# Patient Record
Sex: FEMALE | Race: WHITE | HISPANIC OR LATINO | URBAN - METROPOLITAN AREA
[De-identification: names, ages, dates, MRNs, and addresses within clinical notes are randomized per-mention and may not be internally consistent; named-entity substitution may affect disease eponyms.]

---

## 2017-01-02 ENCOUNTER — EMERGENCY (EMERGENCY)
Facility: HOSPITAL | Age: 27
LOS: 0 days | Discharge: HOME | End: 2017-01-02

## 2017-06-27 DIAGNOSIS — Z88.0 ALLERGY STATUS TO PENICILLIN: ICD-10-CM

## 2017-06-27 DIAGNOSIS — Z90.89 ACQUIRED ABSENCE OF OTHER ORGANS: ICD-10-CM

## 2017-06-27 DIAGNOSIS — J09.X2 INFLUENZA DUE TO IDENTIFIED NOVEL INFLUENZA A VIRUS WITH OTHER RESPIRATORY MANIFESTATIONS: ICD-10-CM

## 2017-06-27 DIAGNOSIS — Z98.890 OTHER SPECIFIED POSTPROCEDURAL STATES: ICD-10-CM

## 2017-06-27 DIAGNOSIS — J45.909 UNSPECIFIED ASTHMA, UNCOMPLICATED: ICD-10-CM

## 2017-06-27 DIAGNOSIS — R05 COUGH: ICD-10-CM

## 2017-07-28 ENCOUNTER — EMERGENCY (EMERGENCY)
Facility: HOSPITAL | Age: 27
LOS: 1 days | Discharge: HOME | End: 2017-07-28

## 2017-07-28 DIAGNOSIS — J45.909 UNSPECIFIED ASTHMA, UNCOMPLICATED: ICD-10-CM

## 2017-07-28 DIAGNOSIS — R10.9 UNSPECIFIED ABDOMINAL PAIN: ICD-10-CM

## 2017-07-28 DIAGNOSIS — L53.8 OTHER SPECIFIED ERYTHEMATOUS CONDITIONS: ICD-10-CM

## 2017-07-28 DIAGNOSIS — E28.2 POLYCYSTIC OVARIAN SYNDROME: ICD-10-CM

## 2017-07-28 DIAGNOSIS — Z88.0 ALLERGY STATUS TO PENICILLIN: ICD-10-CM

## 2017-07-28 DIAGNOSIS — Z90.49 ACQUIRED ABSENCE OF OTHER SPECIFIED PARTS OF DIGESTIVE TRACT: ICD-10-CM

## 2017-07-28 DIAGNOSIS — Z98.890 OTHER SPECIFIED POSTPROCEDURAL STATES: ICD-10-CM

## 2017-07-28 DIAGNOSIS — Z48.01 ENCOUNTER FOR CHANGE OR REMOVAL OF SURGICAL WOUND DRESSING: ICD-10-CM

## 2017-11-06 ENCOUNTER — EMERGENCY (EMERGENCY)
Facility: HOSPITAL | Age: 27
LOS: 0 days | Discharge: HOME | End: 2017-11-06
Admitting: INTERNAL MEDICINE

## 2017-11-06 DIAGNOSIS — R10.9 UNSPECIFIED ABDOMINAL PAIN: ICD-10-CM

## 2017-11-06 DIAGNOSIS — E28.2 POLYCYSTIC OVARIAN SYNDROME: ICD-10-CM

## 2017-11-09 ENCOUNTER — EMERGENCY (EMERGENCY)
Facility: HOSPITAL | Age: 27
LOS: 0 days | Discharge: HOME | End: 2017-11-10
Admitting: INTERNAL MEDICINE

## 2017-11-09 DIAGNOSIS — R21 RASH AND OTHER NONSPECIFIC SKIN ERUPTION: ICD-10-CM

## 2017-11-09 DIAGNOSIS — R10.9 UNSPECIFIED ABDOMINAL PAIN: ICD-10-CM

## 2017-11-09 DIAGNOSIS — K13.79 OTHER LESIONS OF ORAL MUCOSA: ICD-10-CM

## 2017-11-09 DIAGNOSIS — E28.2 POLYCYSTIC OVARIAN SYNDROME: ICD-10-CM

## 2017-11-09 DIAGNOSIS — Z90.49 ACQUIRED ABSENCE OF OTHER SPECIFIED PARTS OF DIGESTIVE TRACT: ICD-10-CM

## 2017-11-09 DIAGNOSIS — K12.0 RECURRENT ORAL APHTHAE: ICD-10-CM

## 2017-11-09 DIAGNOSIS — Z90.89 ACQUIRED ABSENCE OF OTHER ORGANS: ICD-10-CM

## 2017-11-09 DIAGNOSIS — J45.909 UNSPECIFIED ASTHMA, UNCOMPLICATED: ICD-10-CM

## 2017-11-09 DIAGNOSIS — Z88.0 ALLERGY STATUS TO PENICILLIN: ICD-10-CM

## 2017-11-10 DIAGNOSIS — Z90.89 ACQUIRED ABSENCE OF OTHER ORGANS: ICD-10-CM

## 2017-11-10 DIAGNOSIS — Z90.49 ACQUIRED ABSENCE OF OTHER SPECIFIED PARTS OF DIGESTIVE TRACT: ICD-10-CM

## 2017-11-10 DIAGNOSIS — Z88.0 ALLERGY STATUS TO PENICILLIN: ICD-10-CM

## 2017-11-10 DIAGNOSIS — K12.0 RECURRENT ORAL APHTHAE: ICD-10-CM

## 2017-11-10 DIAGNOSIS — K14.9 DISEASE OF TONGUE, UNSPECIFIED: ICD-10-CM

## 2017-11-10 DIAGNOSIS — J45.909 UNSPECIFIED ASTHMA, UNCOMPLICATED: ICD-10-CM

## 2020-03-11 ENCOUNTER — EMERGENCY (EMERGENCY)
Facility: HOSPITAL | Age: 30
LOS: 1 days | Discharge: HOME | End: 2020-03-11
Attending: EMERGENCY MEDICINE | Admitting: EMERGENCY MEDICINE
Payer: COMMERCIAL

## 2020-03-11 VITALS
TEMPERATURE: 100 F | SYSTOLIC BLOOD PRESSURE: 123 MMHG | OXYGEN SATURATION: 97 % | RESPIRATION RATE: 18 BRPM | DIASTOLIC BLOOD PRESSURE: 80 MMHG | HEART RATE: 91 BPM

## 2020-03-11 VITALS
TEMPERATURE: 100 F | OXYGEN SATURATION: 100 % | RESPIRATION RATE: 18 BRPM | SYSTOLIC BLOOD PRESSURE: 136 MMHG | HEART RATE: 89 BPM | DIASTOLIC BLOOD PRESSURE: 75 MMHG

## 2020-03-11 LAB
ANION GAP SERPL CALC-SCNC: 12 MMOL/L — SIGNIFICANT CHANGE UP (ref 7–14)
BASOPHILS # BLD AUTO: 0.05 K/UL — SIGNIFICANT CHANGE UP (ref 0–0.2)
BASOPHILS NFR BLD AUTO: 0.6 % — SIGNIFICANT CHANGE UP (ref 0–1)
BUN SERPL-MCNC: 15 MG/DL — SIGNIFICANT CHANGE UP (ref 10–20)
CALCIUM SERPL-MCNC: 8.8 MG/DL — SIGNIFICANT CHANGE UP (ref 8.5–10.1)
CHLORIDE SERPL-SCNC: 107 MMOL/L — SIGNIFICANT CHANGE UP (ref 98–110)
CO2 SERPL-SCNC: 22 MMOL/L — SIGNIFICANT CHANGE UP (ref 17–32)
CREAT SERPL-MCNC: 0.8 MG/DL — SIGNIFICANT CHANGE UP (ref 0.7–1.5)
EOSINOPHIL # BLD AUTO: 0.03 K/UL — SIGNIFICANT CHANGE UP (ref 0–0.7)
EOSINOPHIL NFR BLD AUTO: 0.4 % — SIGNIFICANT CHANGE UP (ref 0–8)
FLU A RESULT: NEGATIVE — SIGNIFICANT CHANGE UP
FLU A RESULT: NEGATIVE — SIGNIFICANT CHANGE UP
FLUAV AG NPH QL: NEGATIVE — SIGNIFICANT CHANGE UP
FLUBV AG NPH QL: NEGATIVE — SIGNIFICANT CHANGE UP
GLUCOSE SERPL-MCNC: 95 MG/DL — SIGNIFICANT CHANGE UP (ref 70–99)
HCG SERPL QL: NEGATIVE — SIGNIFICANT CHANGE UP
HCT VFR BLD CALC: 38.6 % — SIGNIFICANT CHANGE UP (ref 37–47)
HGB BLD-MCNC: 12.5 G/DL — SIGNIFICANT CHANGE UP (ref 12–16)
IMM GRANULOCYTES NFR BLD AUTO: 0.3 % — SIGNIFICANT CHANGE UP (ref 0.1–0.3)
LACTATE SERPL-SCNC: 2.3 MMOL/L — HIGH (ref 0.7–2)
LYMPHOCYTES # BLD AUTO: 1.85 K/UL — SIGNIFICANT CHANGE UP (ref 1.2–3.4)
LYMPHOCYTES # BLD AUTO: 23.7 % — SIGNIFICANT CHANGE UP (ref 20.5–51.1)
MCHC RBC-ENTMCNC: 26.6 PG — LOW (ref 27–31)
MCHC RBC-ENTMCNC: 32.4 G/DL — SIGNIFICANT CHANGE UP (ref 32–37)
MCV RBC AUTO: 82.1 FL — SIGNIFICANT CHANGE UP (ref 81–99)
MONOCYTES # BLD AUTO: 0.67 K/UL — HIGH (ref 0.1–0.6)
MONOCYTES NFR BLD AUTO: 8.6 % — SIGNIFICANT CHANGE UP (ref 1.7–9.3)
NEUTROPHILS # BLD AUTO: 5.18 K/UL — SIGNIFICANT CHANGE UP (ref 1.4–6.5)
NEUTROPHILS NFR BLD AUTO: 66.4 % — SIGNIFICANT CHANGE UP (ref 42.2–75.2)
NRBC # BLD: 0 /100 WBCS — SIGNIFICANT CHANGE UP (ref 0–0)
PLATELET # BLD AUTO: 275 K/UL — SIGNIFICANT CHANGE UP (ref 130–400)
POTASSIUM SERPL-MCNC: 4 MMOL/L — SIGNIFICANT CHANGE UP (ref 3.5–5)
POTASSIUM SERPL-SCNC: 4 MMOL/L — SIGNIFICANT CHANGE UP (ref 3.5–5)
RBC # BLD: 4.7 M/UL — SIGNIFICANT CHANGE UP (ref 4.2–5.4)
RBC # FLD: 12.6 % — SIGNIFICANT CHANGE UP (ref 11.5–14.5)
RSV RESULT: NEGATIVE — SIGNIFICANT CHANGE UP
RSV RNA RESP QL NAA+PROBE: NEGATIVE — SIGNIFICANT CHANGE UP
SODIUM SERPL-SCNC: 141 MMOL/L — SIGNIFICANT CHANGE UP (ref 135–146)
WBC # BLD: 7.8 K/UL — SIGNIFICANT CHANGE UP (ref 4.8–10.8)
WBC # FLD AUTO: 7.8 K/UL — SIGNIFICANT CHANGE UP (ref 4.8–10.8)

## 2020-03-11 PROCEDURE — 71046 X-RAY EXAM CHEST 2 VIEWS: CPT | Mod: 26

## 2020-03-11 PROCEDURE — 99285 EMERGENCY DEPT VISIT HI MDM: CPT

## 2020-03-11 PROCEDURE — 93010 ELECTROCARDIOGRAM REPORT: CPT

## 2020-03-11 RX ORDER — ACETAMINOPHEN 500 MG
975 TABLET ORAL ONCE
Refills: 0 | Status: COMPLETED | OUTPATIENT
Start: 2020-03-11 | End: 2020-03-11

## 2020-03-11 RX ORDER — KETOROLAC TROMETHAMINE 30 MG/ML
30 SYRINGE (ML) INJECTION ONCE
Refills: 0 | Status: DISCONTINUED | OUTPATIENT
Start: 2020-03-11 | End: 2020-03-11

## 2020-03-11 RX ORDER — SODIUM CHLORIDE 9 MG/ML
1000 INJECTION, SOLUTION INTRAVENOUS ONCE
Refills: 0 | Status: COMPLETED | OUTPATIENT
Start: 2020-03-11 | End: 2020-03-11

## 2020-03-11 RX ADMIN — SODIUM CHLORIDE 1000 MILLILITER(S): 9 INJECTION, SOLUTION INTRAVENOUS at 18:50

## 2020-03-11 RX ADMIN — SODIUM CHLORIDE 1000 MILLILITER(S): 9 INJECTION, SOLUTION INTRAVENOUS at 17:12

## 2020-03-11 RX ADMIN — Medication 30 MILLIGRAM(S): at 17:13

## 2020-03-11 RX ADMIN — Medication 975 MILLIGRAM(S): at 18:27

## 2020-03-11 NOTE — ED PROVIDER NOTE - PROGRESS NOTE DETAILS
pt placed in [facility] program [place]: patient signed out to me by YIMI parks to f/u rpt vbg, ekg, disposition. patient currently talking on cell phone, no complaints at this time. fluids hanging, will reassess.

## 2020-03-11 NOTE — ED PROVIDER NOTE - PATIENT PORTAL LINK FT
You can access the FollowMyHealth Patient Portal offered by [facility] by registering at the following website: [email or web address]. By joining [employer]’s FollowMyHealth portal, you will also be able to view your health information using other applications (apps) compatible with our system.

## 2020-03-11 NOTE — ED PROVIDER NOTE - CLINICAL SUMMARY MEDICAL DECISION MAKING FREE TEXT BOX
pt with 3 day h/o flu-like symptoms.  flu swab neg.  cxr - no infiltrates seen.  labs reviewed, cbc/cmp ok, lactate 2.3.  Pt received IVF in ER, well appearing, VS ok, plan for d/c home.  pt did not want to wait for repeat lactate.  d/w pt to self-quarantine at home, to f/u with pmd, and told to return to ER if she feels worse, has worsening sob, weakness, or any other new/concerning symptoms.  pt d/c'd with covid-19 education document.

## 2020-03-11 NOTE — ED PROVIDER NOTE - NSFOLLOWUPINSTRUCTIONS_ED_ALL_ED_FT
The number of available tests is very limited so strict rules exist for who is allowed to be tested. [facility] has been authorized to perform testing and is currently working hard to be able to start providing the test. Such testing is currently reserved for patients who have had contact with someone infected with the virus, or those who are very sick a plus those who have traveled to areas identified by the Centers for Disease Control and Prevention (CDC) and will require hospitalization.    If you are well enough to be discharged home and are not in a high risk group to have contracted the COVID-19, you should care for yourself at home exactly like you would if you have Influenza “flu”. Follow all the standard guidelines about washing your hands, covering your cough, etc. You should return to the Emergency Department if you develop worse symptoms, trouble breathing, chest pain, and/or a fever that doesn’t improve with over the counter acetaminophen or ibuprofen.

## 2020-03-11 NOTE — ED ADULT TRIAGE NOTE - CHIEF COMPLAINT QUOTE
Pt with fever and cough, sent in by MD Cifuentes R/O Coronavirus, no contact with any infected persons, works for the board of [employer]

## 2020-03-11 NOTE — ED PROVIDER NOTE - PHYSICAL EXAMINATION
VITAL SIGNS: I have reviewed nursing notes and confirm.  CONSTITUTIONAL: Well-developed; well-nourished; in no acute distress.   SKIN: skin exam is warm and dry, no acute rash.    HEAD: Normocephalic; atraumatic.  EYES: conjunctiva and sclera clear.  ENT: No nasal discharge; airway clear.  NECK: Supple; non tender.  CARD: S1, S2 normal; no murmurs, gallops, or rubs. Regular rate and rhythm.   RESP: No wheezes, rales or rhonchi.  ABD: Normal bowel sounds; soft; non-distended; non-tender  EXT: Normal ROM.  No clubbing, cyanosis or edema.   LYMPH: No acute cervical adenopathy.  NEURO: Alert, oriented, grossly unremarkable  PSYCH: Cooperative, appropriate.

## 2020-03-11 NOTE — ED PROVIDER NOTE - NS ED ROS FT
Eyes:  No visual changes, eye pain or discharge.  ENMT:  No hearing changes, pain, discharge or infections. No neck pain or stiffness.  Cardiac:  No chest pain, SOB or edema. No chest pain with exertion.  Respiratory:  + cough + asthma, No respiratory distress. No hemoptysis.   GI:  No nausea, vomiting, diarrhea or abdominal pain.  :  No dysuria, frequency or burning.  MS:  No myalgia, muscle weakness, joint pain or back pain.  Neuro:  No headache or weakness.  No LOC.  Skin:  No skin rash.   Endocrine: No history of thyroid disease or diabetes.  Except as documented in the HPI,  all other systems are negative.

## 2020-03-11 NOTE — ED ADULT NURSE NOTE - NSIMPLEMENTINTERV_GEN_ALL_ED
Implemented All Universal Safety Interventions:  [place] to call system. Call bell, personal items and telephone within reach. Instruct patient to call for assistance. Room bathroom lighting operational. Non-slip footwear when patient is off stretcher. Physically safe environment: no spills, clutter or unnecessary equipment. Stretcher in lowest position, wheels locked, appropriate side rails in place.

## 2020-03-11 NOTE — ED PROVIDER NOTE - CARE PROVIDER_API CALL
Abelardo Cifuentes)  [place] [address]  Phone: [phone]  Fax: [phone]  Follow Up Time: 1-3 Days

## 2020-03-11 NOTE — ED PROVIDER NOTE - ATTENDING CONTRIBUTION TO CARE
30 y/o female with h/o asthma, pcos, in ER with c/o flu-like symptoms for past 3 days. + temp (to 100 in ER), no chills, + non-productive cough with some sob.  no cp.  + sore throat.  + body aches and generalized weakness.   decreased appetite. no abd pain.  no n/v/d.  no urinary symptoms. no rash.  no ha/dizziness/loc.  denies any recent travel. no known covid-19 contacts.    PE- nad, nc/at, eomi, perrl, op - clear, mmm, no pharyngeal erythema, neck supple with from, no resp distress, cta b/l, no w/r/r, rrr, abd - soft, nt/nd, nabs, from x 4, A&O x 3, no focal neuro deficits.  -ivf, check labs, cxr, flu/rvp swab.

## 2020-03-11 NOTE — ED PROVIDER NOTE - OBJECTIVE STATEMENT
Pt is a 28y/o female with a pmhx of asthma, PCOS presents today for eval of sore throat/fever/dry cough x 3 days that have been constant with no aggravating/alleviating factors. Pt denies fever, chills, weakness, numbness.

## 2020-03-12 ENCOUNTER — TRANSCRIPTION ENCOUNTER (OUTPATIENT)
Age: 30
End: 2020-03-12

## 2020-03-12 LAB — RAPID RVP RESULT: SIGNIFICANT CHANGE UP

## 2020-03-15 DIAGNOSIS — J06.9 ACUTE UPPER RESPIRATORY INFECTION, UNSPECIFIED: ICD-10-CM

## 2020-03-15 DIAGNOSIS — R05 COUGH: ICD-10-CM

## 2020-03-15 DIAGNOSIS — Z88.0 ALLERGY STATUS TO PENICILLIN: ICD-10-CM

## 2020-03-15 DIAGNOSIS — R09.81 NASAL CONGESTION: ICD-10-CM

## 2020-03-15 DIAGNOSIS — R50.9 FEVER, UNSPECIFIED: ICD-10-CM

## 2020-03-15 DIAGNOSIS — Z87.09 PERSONAL HISTORY OF OTHER DISEASES OF THE RESPIRATORY SYSTEM: ICD-10-CM

## 2020-03-15 DIAGNOSIS — Z91.012 ALLERGY TO EGGS: ICD-10-CM

## 2020-03-27 ENCOUNTER — EMERGENCY (EMERGENCY)
Facility: HOSPITAL | Age: 30
LOS: 0 days | Discharge: HOME | End: 2020-03-27
Admitting: EMERGENCY MEDICINE
Payer: COMMERCIAL

## 2020-03-27 VITALS
TEMPERATURE: 99 F | HEART RATE: 96 BPM | OXYGEN SATURATION: 99 % | SYSTOLIC BLOOD PRESSURE: 118 MMHG | RESPIRATION RATE: 18 BRPM | DIASTOLIC BLOOD PRESSURE: 75 MMHG

## 2020-03-27 DIAGNOSIS — Z91.012 ALLERGY TO EGGS: ICD-10-CM

## 2020-03-27 DIAGNOSIS — J45.909 UNSPECIFIED ASTHMA, UNCOMPLICATED: ICD-10-CM

## 2020-03-27 DIAGNOSIS — R05 COUGH: ICD-10-CM

## 2020-03-27 DIAGNOSIS — J06.9 ACUTE UPPER RESPIRATORY INFECTION, UNSPECIFIED: ICD-10-CM

## 2020-03-27 DIAGNOSIS — Z88.0 ALLERGY STATUS TO PENICILLIN: ICD-10-CM

## 2020-03-27 PROCEDURE — 71045 X-RAY EXAM CHEST 1 VIEW: CPT | Mod: 26

## 2020-03-27 PROCEDURE — 99284 EMERGENCY DEPT VISIT MOD MDM: CPT

## 2020-03-27 RX ORDER — IBUPROFEN 200 MG
600 TABLET ORAL ONCE
Refills: 0 | Status: COMPLETED | OUTPATIENT
Start: 2020-03-27 | End: 2020-03-27

## 2020-03-27 RX ORDER — ALBUTEROL 90 UG/1
2 AEROSOL, METERED ORAL ONCE
Refills: 0 | Status: COMPLETED | OUTPATIENT
Start: 2020-03-27 | End: 2020-03-27

## 2020-03-27 RX ORDER — IPRATROPIUM/ALBUTEROL SULFATE 18-103MCG
3 AEROSOL WITH ADAPTER (GRAM) INHALATION
Qty: 45 | Refills: 0
Start: 2020-03-27 | End: 2020-03-31

## 2020-03-27 RX ORDER — ACETAMINOPHEN 500 MG
2 TABLET ORAL
Qty: 30 | Refills: 0
Start: 2020-03-27 | End: 2020-03-31

## 2020-03-27 RX ADMIN — ALBUTEROL 2 PUFF(S): 90 AEROSOL, METERED ORAL at 18:23

## 2020-03-27 RX ADMIN — Medication 600 MILLIGRAM(S): at 18:23

## 2020-03-27 NOTE — ED ADULT TRIAGE NOTE - CHIEF COMPLAINT QUOTE
pt reports coughing, fever since March 11   c/o difficulty breathing   reports she has a coworker who is (+)COVID  pt speaking in full sentences in triage, no s/s respiratory distress noted

## 2020-03-27 NOTE — ED PROVIDER NOTE - CARE PLAN
Principal Discharge DX:	Cough  Secondary Diagnosis:	URI (upper respiratory infection)  Secondary Diagnosis:	Asthma

## 2020-03-27 NOTE — ED PROVIDER NOTE - OBJECTIVE STATEMENT
30 y/o female with hx Asthma, PCOS presents to the ED c/o "I've been sick since 3/11. I have fever, body aches, fatigue, cough and congestion. I feel SOB. I'm currently taking a medrol dosepak. +sick contacts covid 19. "

## 2020-03-27 NOTE — ED PROVIDER NOTE - NSFOLLOWUPINSTRUCTIONS_ED_ALL_ED_FT
Viral Respiratory Infection    A viral respiratory infection is an illness that affects parts of the body used for breathing, like the lungs, nose, and throat. It is caused by a germ called a virus. Symptoms can include runny nose, coughing, sneezing, fatigue, body aches, sore throat, fever, or headache. Over the counter medicine can be used to manage the symptoms but the infection typically goes away on its own in 5 to 10 days.     SEEK IMMEDIATE MEDICAL CARE IF YOU HAVE ANY OF THE FOLLOWING SYMPTOMS: shortness of breath, chest pain, fever over 10 days, or lightheadedness/dizziness.  Asthma    Asthma is a condition in which the airways tighten and narrow, making it difficult to breath. Asthma episodes, also called asthma attacks, range from minor to life-threatening. Symptoms include wheezing, coughing, chest tightness, or shortness of breath. The diagnosis of asthma is made by a review of your medical history and a physical exam, but may involve additional testing. Asthma cannot be cured, but medicines and lifestyle changes can help control it. Avoid triggers of asthma which may include animal dander, pollen, mold, smoke, air pollutants, etc.     SEEK IMMEDIATE MEDICAL CARE IF YOU HAVE ANY OF THE FOLLOWING SYMPTOMS: worsening of symptoms, shortness of breath at rest, chest pain, bluish discoloration to lips or fingertips, lightheadedness/dizziness, or fever.

## 2020-03-27 NOTE — ED PROVIDER NOTE - PATIENT PORTAL LINK FT
You can access the FollowMyHealth Patient Portal offered by [facility] by registering at the following website: [email or web address]. By joining [employer]’s FollowMyHealth portal, you will also be able to view your health information using other applications (apps) compatible with our system.

## 2020-03-27 NOTE — ED ADULT NURSE NOTE - OBJECTIVE STATEMENT
Patient c/o fevers chill and cough since beginning of march and states started having fevers over the last few days.

## 2022-06-07 ENCOUNTER — NON-APPOINTMENT (OUTPATIENT)
Age: 32
End: 2022-06-07

## 2022-10-31 NOTE — ED ADULT TRIAGE NOTE - TEMPERATURE IN CELSIUS (DEGREES C)
Keflex 500 mg oral capsule , 1 cap(s) orally 2 times a day   latanoprost 0.005% ophthalmic solution , 1 drop(s) to each affected eye once a day (at bedtime)  aspirin 81 mg oral tablet , 81 milligram(s) orally once a day  benztropine 1 mg oral tablet , 1 tab(s) orally once a day (at bedtime)  divalproex sodium 500 mg oral tablet, extended release , 2 tab(s) orally once a day (at bedtime)  Haldol Decanoate 50 mg/mL intramuscular solution , intramuscular every 4 weeks  Rhopressa 0.02% ophthalmic solution , 1 drop(s) to each affected eye once a day (in the evening)  Combigan 0.2%-0.5% ophthalmic solution , 1 drop(s) to each affected eye every 12 hours   38 haloperidol 1 mg oral tablet , 1 tab(s) orally 2 times a day x 30 days   amLODIPine 10 mg oral tablet , 1 tab(s) orally once a day x 30 days   Haldol Decanoate 50 mg/mL intramuscular solution , 100 milligram(s) intramuscular every 4 weeks  Next due on 11/13/2022  benztropine 0.5 mg oral tablet , 1 tab(s) orally 2 times a day x 30 days

## 2022-12-02 NOTE — ED PROVIDER NOTE - TOBACCO USE
This was a shared visit with the NEVAEH. I reviewed and verified the documentation and independently performed the documented: Never smoker

## 2023-10-24 PROBLEM — E28.2 POLYCYSTIC OVARIAN SYNDROME: Chronic | Status: ACTIVE | Noted: 2020-03-27

## 2023-10-24 PROBLEM — J45.909 UNSPECIFIED ASTHMA, UNCOMPLICATED: Chronic | Status: ACTIVE | Noted: 2020-03-27

## 2023-11-13 ENCOUNTER — LABORATORY RESULT (OUTPATIENT)
Age: 33
End: 2023-11-13

## 2023-11-14 ENCOUNTER — APPOINTMENT (OUTPATIENT)
Dept: OBGYN | Facility: CLINIC | Age: 33
End: 2023-11-14
Payer: COMMERCIAL

## 2023-11-14 VITALS — TEMPERATURE: 98 F | HEIGHT: 60 IN | WEIGHT: 210 LBS | BODY MASS INDEX: 41.23 KG/M2

## 2023-11-14 DIAGNOSIS — G43.909 MIGRAINE, UNSPECIFIED, NOT INTRACTABLE, W/OUT STATUS MIGRAINOSUS: ICD-10-CM

## 2023-11-14 DIAGNOSIS — Z01.419 ENCOUNTER FOR GYNECOLOGICAL EXAMINATION (GENERAL) (ROUTINE) W/OUT ABNORMAL FINDINGS: ICD-10-CM

## 2023-11-14 PROCEDURE — 76813 OB US NUCHAL MEAS 1 GEST: CPT

## 2023-11-14 PROCEDURE — 99385 PREV VISIT NEW AGE 18-39: CPT

## 2023-11-19 PROBLEM — G43.909 MIGRAINE WITHOUT STATUS MIGRAINOSUS, NOT INTRACTABLE, UNSPECIFIED MIGRAINE TYPE: Status: ACTIVE | Noted: 2023-11-19

## 2023-11-19 PROBLEM — Z01.419 WELL WOMAN EXAM WITH ROUTINE GYNECOLOGICAL EXAM: Status: ACTIVE | Noted: 2023-11-19

## 2023-11-22 ENCOUNTER — LABORATORY RESULT (OUTPATIENT)
Age: 33
End: 2023-11-22

## 2023-12-06 ENCOUNTER — NON-APPOINTMENT (OUTPATIENT)
Age: 33
End: 2023-12-06

## 2023-12-06 ENCOUNTER — APPOINTMENT (OUTPATIENT)
Dept: OBGYN | Facility: CLINIC | Age: 33
End: 2023-12-06
Payer: COMMERCIAL

## 2023-12-06 PROCEDURE — 0500F INITIAL PRENATAL CARE VISIT: CPT

## 2023-12-19 ENCOUNTER — NON-APPOINTMENT (OUTPATIENT)
Age: 33
End: 2023-12-19

## 2023-12-21 ENCOUNTER — NON-APPOINTMENT (OUTPATIENT)
Age: 33
End: 2023-12-21

## 2023-12-22 ENCOUNTER — EMERGENCY (EMERGENCY)
Facility: HOSPITAL | Age: 33
LOS: 0 days | Discharge: HOME | End: 2023-12-22
Attending: EMERGENCY MEDICINE
Payer: COMMERCIAL

## 2023-12-22 VITALS
TEMPERATURE: 98 F | SYSTOLIC BLOOD PRESSURE: 126 MMHG | DIASTOLIC BLOOD PRESSURE: 71 MMHG | HEIGHT: 60 IN | RESPIRATION RATE: 18 BRPM | HEART RATE: 111 BPM | OXYGEN SATURATION: 100 % | WEIGHT: 212.08 LBS

## 2023-12-22 DIAGNOSIS — N91.1 SECONDARY AMENORRHEA: ICD-10-CM

## 2023-12-22 DIAGNOSIS — Z23 ENCOUNTER FOR IMMUNIZATION: ICD-10-CM

## 2023-12-22 DIAGNOSIS — Z87.42 PERSONAL HISTORY OF OTHER DISEASES OF THE FEMALE GENITAL TRACT: ICD-10-CM
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
## 2023-12-22 DIAGNOSIS — S61.512A LACERATION WITHOUT FOREIGN BODY OF LEFT WRIST, INITIAL ENCOUNTER: ICD-10-CM

## 2023-12-22 DIAGNOSIS — Z91.012 ALLERGY TO EGGS: ICD-10-CM

## 2023-12-22 DIAGNOSIS — Z3A.17 17 WEEKS GESTATION OF PREGNANCY: ICD-10-CM

## 2023-12-22 DIAGNOSIS — Z88.0 ALLERGY STATUS TO PENICILLIN: ICD-10-CM

## 2023-12-22 DIAGNOSIS — O9A.212 INJURY, POISONING AND CERTAIN OTHER CONSEQUENCES OF EXTERNAL CAUSES COMPLICATING PREGNANCY, SECOND TRIMESTER: ICD-10-CM
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
## 2023-12-22 DIAGNOSIS — O98.512 OTHER VIRAL DISEASES COMPLICATING PREGNANCY, SECOND TRIMESTER: ICD-10-CM

## 2023-12-22 DIAGNOSIS — U07.1 COVID-19: ICD-10-CM

## 2023-12-22 LAB
FLUAV AG NPH QL: SIGNIFICANT CHANGE UP
FLUAV AG NPH QL: SIGNIFICANT CHANGE UP
FLUBV AG NPH QL: SIGNIFICANT CHANGE UP
FLUBV AG NPH QL: SIGNIFICANT CHANGE UP
RSV RNA NPH QL NAA+NON-PROBE: SIGNIFICANT CHANGE UP
RSV RNA NPH QL NAA+NON-PROBE: SIGNIFICANT CHANGE UP
SARS-COV-2 RNA SPEC QL NAA+PROBE: DETECTED
SARS-COV-2 RNA SPEC QL NAA+PROBE: DETECTED

## 2023-12-22 PROCEDURE — 90715 TDAP VACCINE 7 YRS/> IM: CPT

## 2023-12-22 PROCEDURE — 12031 INTMD RPR S/A/T/EXT 2.5 CM/<: CPT

## 2023-12-22 PROCEDURE — 0241U: CPT

## 2023-12-22 PROCEDURE — 12001 RPR S/N/AX/GEN/TRNK 2.5CM/<: CPT

## 2023-12-22 PROCEDURE — 99283 EMERGENCY DEPT VISIT LOW MDM: CPT | Mod: 25

## 2023-12-22 PROCEDURE — 90471 IMMUNIZATION ADMIN: CPT

## 2023-12-22 PROCEDURE — 99284 EMERGENCY DEPT VISIT MOD MDM: CPT | Mod: 25

## 2023-12-22 RX ORDER — TETANUS TOXOID, REDUCED DIPHTHERIA TOXOID AND ACELLULAR PERTUSSIS VACCINE, ADSORBED 5; 2.5; 8; 8; 2.5 [IU]/.5ML; [IU]/.5ML; UG/.5ML; UG/.5ML; UG/.5ML
0.5 SUSPENSION INTRAMUSCULAR ONCE
Refills: 0 | Status: COMPLETED | OUTPATIENT
Start: 2023-12-22 | End: 2023-12-22

## 2023-12-22 RX ADMIN — TETANUS TOXOID, REDUCED DIPHTHERIA TOXOID AND ACELLULAR PERTUSSIS VACCINE, ADSORBED 0.5 MILLILITER(S): 5; 2.5; 8; 8; 2.5 SUSPENSION INTRAMUSCULAR at 17:17

## 2023-12-22 NOTE — ED PROVIDER NOTE - PATIENT PORTAL LINK FT
You can access the FollowMyHealth Patient Portal offered by [facility] by registering at the following website: [email or web address]. By joining [employer]’s FollowMyHealth portal, you will also be able to view your health information using other applications (apps) compatible with our system. You can access the FollowMyHealth Patient Portal offered by [facility] by registering at the following website: [email or web address]. By joining "[employer]"’s FollowMyHealth portal, you will also be able to view your health information using other applications (apps) compatible with our system.

## 2023-12-22 NOTE — ED PROVIDER NOTE - IV ALTEPLASE DOOR HIDDEN
Monitor: The problem is improving with treatment.  Evaluation: No labs/tests required today.  Assessment/Treatment:  Managed by specialist.   Scans in 2 mos   show

## 2023-12-22 NOTE — ED PROVIDER NOTE - NSFOLLOWUPINSTRUCTIONS_ED_ALL_ED_FT
please return in 10-12 days for wound check and suture removal    Wound Care    Taking care of your wound properly can help to prevent pain and infection. It can also help your wound to heal more quickly.     HOW TO CARE FOR YOUR WOUND   Take or apply over-the-counter and prescription medicines only as told by your health care provider.  If you were prescribed antibiotic medicine, take or apply it as told by your health care provider. Do not stop using the antibiotic even if your condition improves.  Clean the wound each day or as told by your health care provider.  Wash the wound with mild soap and water.  Rinse the wound with water to remove all soap.  Pat the wound dry with a clean towel. Do not rub it.  There are many different ways to close and cover a wound. For example, a wound can be covered with stitches (sutures), skin glue, or adhesive strips. Follow instructions from your health care provider about:  How to take care of your wound.  When and how you should change your bandage (dressing).  When you should remove your dressing.  Removing whatever was used to close your wound.  Check your wound every day for signs of infection. Watch for:  Redness, swelling, or pain.  Fluid, blood, or pus.  Keep the dressing dry until your health care provider says it can be removed. Do not take baths, swim, use a hot tub, or do anything that would put your wound underwater until your health care provider approves.  Raise (elevate) the injured area above the level of your heart while you are sitting or lying down.  Do not scratch or pick at the wound.  Keep all follow-up visits as told by your health care provider. This is important.    SEEK MEDICAL CARE IF:  You received a tetanus shot and you have swelling, severe pain, redness, or bleeding at the injection site.  You have a fever.  Your pain is not controlled with medicine.  You have increased redness, swelling, or pain at the site of your wound.  You have fluid, blood, or pus coming from your wound.  You notice a bad smell coming from your wound or your dressing.    SEEK IMMEDIATE MEDICAL CARE IF:  You have a red streak going away from your wound.

## 2023-12-22 NOTE — ED PROVIDER NOTE - OBJECTIVE STATEMENT
33-year-old female 17 weeks pregnant presented to ED for evaluation of laceration to the left arm status post negative slice.  Patient states she was preparing food when she excellently cut her arm getting small incision near her wrist.  Patient states she also believes she may have COVID and is requesting COVID test.  Bleeding controlled in the

## 2023-12-22 NOTE — ED PROVIDER NOTE - PHYSICAL EXAMINATION
VITAL SIGNS: I have reviewed nursing notes and confirm.  CONSTITUTIONAL:  in no acute distress.  SKIN: Skin exam is warm and dry, no acute rash. 1.5 cm laceration to left wrist vertically, no active bleeding   HEAD: Normocephalic; atraumatic.

## 2023-12-22 NOTE — ED PROVIDER NOTE - CLINICAL SUMMARY MEDICAL DECISION MAKING FREE TEXT BOX
Laceration as described above.  Hemostatic on exam.  Normal motor and sensation distally.  Repaired under local.  DC home.  Sutures out in 7-10 days.  Tdap given.  Strict return/infection instructions discussed.

## 2023-12-22 NOTE — ED ADULT NURSE NOTE - NSFALLUNIVINTERV_ED_ALL_ED
Bed/Stretcher in lowest position, wheels locked, appropriate side rails in place/Call bell, personal items and telephone in reach/Instruct patient to call for assistance before getting out of bed/chair/stretcher/Non-slip footwear applied when patient is off stretcher/[place] to call system/Physically safe environment - no spills, clutter or unnecessary equipment/Purposeful proactive rounding/Room/bathroom lighting operational, light cord in reach Bed/Stretcher in lowest position, wheels locked, appropriate side rails in place/Call bell, personal items and telephone in reach/Instruct patient to call for assistance before getting out of bed/chair/stretcher/Non-slip footwear applied when patient is off stretcher/[place] to call system/Physically safe environment - no spills, clutter or unnecessary equipment/Purposeful proactive rounding/Room/bathroom lighting operational, light cord in reach

## 2023-12-22 NOTE — ED ADULT TRIAGE NOTE - CHIEF COMPLAINT QUOTE
Pt presents with laceration to left forearm after slicing in while cleaning pork. Pt also 17 weeks pregnant. Pt also COVID +

## 2023-12-26 ENCOUNTER — TRANSCRIPTION ENCOUNTER (OUTPATIENT)
Age: 33
End: 2023-12-26

## 2023-12-26 ENCOUNTER — LABORATORY RESULT (OUTPATIENT)
Age: 33
End: 2023-12-26

## 2023-12-26 ENCOUNTER — APPOINTMENT (OUTPATIENT)
Dept: OBGYN | Facility: CLINIC | Age: 33
End: 2023-12-26
Payer: COMMERCIAL

## 2023-12-26 VITALS — HEIGHT: 60 IN | WEIGHT: 209 LBS | BODY MASS INDEX: 41.03 KG/M2

## 2023-12-26 PROCEDURE — 0502F SUBSEQUENT PRENATAL CARE: CPT

## 2024-01-03 ENCOUNTER — NON-APPOINTMENT (OUTPATIENT)
Age: 34
End: 2024-01-03

## 2024-01-04 ENCOUNTER — APPOINTMENT (OUTPATIENT)
Dept: OBGYN | Facility: CLINIC | Age: 34
End: 2024-01-04
Payer: COMMERCIAL

## 2024-01-04 ENCOUNTER — NON-APPOINTMENT (OUTPATIENT)
Age: 34
End: 2024-01-04

## 2024-01-04 PROCEDURE — 76816 OB US FOLLOW-UP PER FETUS: CPT

## 2024-01-04 PROCEDURE — 76805 OB US >/= 14 WKS SNGL FETUS: CPT

## 2024-01-05 ENCOUNTER — APPOINTMENT (OUTPATIENT)
Dept: ANTEPARTUM | Facility: CLINIC | Age: 34
End: 2024-01-05
Payer: COMMERCIAL

## 2024-01-05 ENCOUNTER — OUTPATIENT (OUTPATIENT)
Dept: OUTPATIENT SERVICES | Facility: HOSPITAL | Age: 34
LOS: 1 days | End: 2024-01-05
Payer: COMMERCIAL

## 2024-01-05 DIAGNOSIS — O28.1 ABNORMAL BIOCHEMICAL FINDING ON ANTENATAL SCREENING OF MOTHER: ICD-10-CM

## 2024-01-05 DIAGNOSIS — Z34.90 ENCOUNTER FOR SUPERVISION OF NORMAL PREGNANCY, UNSPECIFIED, UNSPECIFIED TRIMESTER: ICD-10-CM

## 2024-01-05 PROCEDURE — 96040: CPT

## 2024-01-05 PROCEDURE — 99212 OFFICE O/P EST SF 10 MIN: CPT

## 2024-01-05 NOTE — HISTORY OF PRESENT ILLNESS
[FreeTextEntry1] : REASON FOR VISIT: Ms. Martha Gibbons is a 33-year-old  female with a gestational age of _ weeks and _ days based on an BESS of 2024 who was seen for genetic counseling through [facility] on 2024 for elevated maternal serum AFP. The patient was accompanied by her partner, Sayda.   RELEVANT MEDICAL AND SURGICAL HISTORY: - Chronic migraines - Asthma  - PMHx PCOS - Tonsillectomy - Appendectomy   PREGNANCY HISTORY: Conception: Assisted with IVF Bleeding/Spotting: Bleeding in  evening through morning Illness/Infection: COVID 23 fever of 100.7, 23 stitches for cut on arm Fever:  Medications: Baby aspirin,  Alcohol: Denied  Drugs/Smoking: Denied  Other: Denied    COUNSELING NARRATIVE: #High Maternal Serum AFP At her session, we discussed that her AFP was elevated at 2.89 MoMs. The laboratory assessed the risk for an open neural tube defect (ONTD to be 1 in 142. The clinical manifestations of ONTDs were reviewed with this patient. Other possible reasons for elevated MSAFP which were discussed with the patient include an abdominal wall defect, placental bleeds, errors in gestational dating, and multiple gestations. The patient stated she had some bleeding during her pregnancy in October. Additionally, this pregnancy was conceived via donor oocyte, which has been noted to be associated with slightly increased levels of MSAFP.   An ultrasound examination can identify a twin gestation or an incorrect calculation of gestational age. Ultrasound is also helpful in detecting large NTDs.  However, as small NTDs may go undetected by ultrasound, amniocentesis can be performed to measure the amount of AFP in the amniotic fluid.  Measurement of AFP in the amniotic fluid is more reliable than maternal serum AFP. Amniotic fluid can also detect acetylcholinesterase which is a specific marker for neural tube defects. The amniotic fluid AFP test has a 95-98% detection rate with a comprehensive ultrasound for open neural tube defects.  Only open defects (ones in which no membrane or skin covers the opening in the neural tube) can be detected. The risks, benefits, and limitations were explained including the 1/400 risk of miscarriage or other complication with invasive testing. Diagnostic testing also allows for karyotyping and microarray analysis.   Fetal scan was within normal limits for the spine and abdomen. Follow-up ultrasound is recommended to monitor fetal growth.   The patient declined amniocentesis at this time. The patient was also offered a redraw of AFP, with the understanding a normal redraw would not completely eliminate the risk of an affected pregnancy. The patient opted to have her blood redrawn to test her serum AFP. The patient was also given a script for viral studies (CMV, Parvo, Toxo), to evaluate for possible viral infections during her pregnancy.   We discussed that unexplained elevations of MSAFP have been associated with an increased risk of obstetrical complications later in pregnancy.   #IVF  We reviewed the risks associated with in vitro fertilization (IVF) pregnancies.  The IVF procedure itself does not appear to lead to a higher risk of chromosomal abnormalities compared to the general population risk, however, several other factors may increase the risk, such as pregnancy at an older age, polycystic ovarian syndrome and severe male or female factor infertility. Genetic issues observed include microdeletion of the Y chromosome, fragile X syndrome and imprinting disorders (i.e. Elmer-Wiedemann syndrome, Karsten -Silver syndrome, Angelman syndrome and Prader-Willi syndrome). Pre-implantation genetic testing (PGT) often accompanies IVF to screen for either aneuploidy, monogenic or structural disorders, however, PGT is a technique generally testing the trophectoderm which eventually gives rise to the placenta, not the fetus. Discordant aneuploidy findings between the placenta and fetus have been reported to be as high as 50% and misdiagnosis of the embryo can occur, therefore, prenatal genetic screening and diagnostic testing is still recommended.   The accuracy of first-trimester screening has also been shown to be affected by IVF pregnancies. An increased risk of congenital anomalies has also been observed in IVF pregnancies (475.8 per 10,000 births) compared to naturally occurring pregnancies (317.6 per 10,000 births), therefore, we recommend a detailed obstetrical ultrasound examination be performed. A systematic review demonstrated an increased risk for congenital heart disease compared to naturally occurring pregnancies (1.30% vs 0.68%), therefore, we recommend a fetal echocardiogram.  We also discussed the higher risks for abnormal placental shape or implantation, therefore, careful examination of the placental location, shape and cord insertion site should be performed. IVF has also been associated with higher odds of fetal growth restriction,  delivery, low birthweight and stillbirth when compared with naturally occurring births.   Visualization of the cervix at 18-22 weeks GA with transabdominal or endovaginal approach and weekly  fetal surveillance beginning by 36 weeks GA is recommended. IVF and underlying infertility has also been observed to have higher adverse  outcomes, including hypertensive disorders, aspirin should be considered if one or more additional risk factors are present.    PRENATAL TESTING: We discussed the correlation between maternal age and Down syndrome, and the phenotype and natural history of Down syndrome and other common aneuploidies were reviewed.   The patient previously had cell-free fetal DNA screening performed for this pregnancy which was low risk for aneuploidies for the chromosomes studied (21, 18, 13, X, Y). Cell-free fetal DNA screening demonstrated a pregnancy consistent with male.  The patient was given the option of cell-free fetal DNA screening or diagnostic testing.  The risks, benefits, and limitations of each option were explained including the 1400 risk of miscarriage or other complication with invasive testing. Diagnostic testing also allows for microarray analysis. Unlike chorionic villus sampling (CVS) and amniocentesis which are diagnostic procedures, cell-free fetal DNA screening is a screening test with very high sensitivity and specificity for trisomies 21, 18, and 13. It can also detect sex chromosome aneuploidy and several microdeletion syndromes but with lower sensitivity and specificity. If cell-free fetal DNA screening is positive, ultrasound and diagnostic testing are recommended for confirmation.  If cell-free fetal DNA screening is negative, it is not a guarantee of an unaffected child.  She also has the option not to test.   The patient declined diagnostic testing at this time.   FAMILY HISTORY: A 3 generation pedigree was obtained and will be scanned into the medical record. Of note, we discussed the following significant family history:   The family history was otherwise negative for known significant birth defects, intellectual disability, consanguinity and known genetic diseases.  There is a 3-5% background risk for any birth defect or developmental issue with every pregnancy.   CARRIER SCREENING: The patient is of _ descent and her partner is of _ descent.   Hemoglobin electrophoresis: ; MCV value: (hemoglobinopathies/thalassemias) Cystic fibrosis: Spinal muscular atrophy: Fragile-X:   Comprehensive carrier screening was done   SUMMARY AND PLAN: - We discussed the elevated MSAFP. The patient opted to pursue AFP redraw and viral studies. She declined amniocentesis at this time. - Results will be phoned to the patient and uploaded to the patient's medical record/faxed to your office once they are available.   The patient expressed understanding and all of her questions were answered in detail. If you have any additional questions, please feel free to call me at [phone] or [phone]. Thank you for referring this patient.   Sincerely,   Rosalina Santillan MS, [facility] Genetic Counselor

## 2024-01-08 DIAGNOSIS — Z31.5 ENCOUNTER FOR PROCREATIVE GENETIC COUNSELING: ICD-10-CM

## 2024-01-08 DIAGNOSIS — O28.1 ABNORMAL BIOCHEMICAL FINDING ON ANTENATAL SCREENING OF MOTHER: ICD-10-CM

## 2024-01-10 RX ORDER — ONDANSETRON 4 MG/1
4 TABLET, ORALLY DISINTEGRATING ORAL
Qty: 30 | Refills: 0 | Status: ACTIVE | COMMUNITY
Start: 2024-01-10 | End: 1900-01-01

## 2024-01-11 ENCOUNTER — APPOINTMENT (OUTPATIENT)
Dept: OBGYN | Facility: CLINIC | Age: 34
End: 2024-01-11

## 2024-01-12 ENCOUNTER — NON-APPOINTMENT (OUTPATIENT)
Age: 34
End: 2024-01-12

## 2024-01-13 ENCOUNTER — NON-APPOINTMENT (OUTPATIENT)
Age: 34
End: 2024-01-13

## 2024-01-13 LAB
AFP MOM: 2.23
AFP VALUE: 95.3 NG/ML
ALPHA FETOPROTEIN SERUM COMMENT: NORMAL
ALPHA FETOPROTEIN SERUM INTERPRETATION: NORMAL
ALPHA FETOPROTEIN SERUM RESULTS: NORMAL
ALPHA FETOPROTEIN SERUM TEST RESULTS: NORMAL
B19V IGG SER QL IA: POSITIVE
B19V IGG+IGM SER-IMP: NORMAL
B19V IGM FLD-ACNC: NEGATIVE
CMV IGG AVIDITY SERPL IA-RTO: NORMAL
CMV IGG SERPL QL: <0.2 U/ML
CMV IGG SERPL-IMP: NEGATIVE
CMV IGM SERPL QL: <8 AU/ML
CMV IGM SERPL QL: NEGATIVE
GESTATIONAL AGE BASED ON: NORMAL
GESTATIONAL AGE ON COLLECTION DATE: 19.3 WEEKS
INSULIN DEP DIABETES: NO
MATERNAL AGE AT EDD AFP: 34 YR
MULTIPLE GESTATION: NO
OSBR RISK 1 IN: 492
RACE: NORMAL
T GONDII AB SER-IMP: NEGATIVE
T GONDII AB SER-IMP: NEGATIVE
T GONDII IGG SER QL: <3 IU/ML
T GONDII IGM SER QL: <3 AU/ML
WEIGHT AFP: 209 LBS

## 2024-01-18 ENCOUNTER — APPOINTMENT (OUTPATIENT)
Dept: OBGYN | Facility: CLINIC | Age: 34
End: 2024-01-18
Payer: COMMERCIAL

## 2024-01-18 VITALS — HEIGHT: 60 IN | WEIGHT: 208 LBS | BODY MASS INDEX: 40.84 KG/M2

## 2024-01-18 PROCEDURE — 0502F SUBSEQUENT PRENATAL CARE: CPT

## 2024-01-24 ENCOUNTER — APPOINTMENT (OUTPATIENT)
Dept: OBGYN | Facility: CLINIC | Age: 34
End: 2024-01-24
Payer: COMMERCIAL

## 2024-01-24 PROCEDURE — 76816 OB US FOLLOW-UP PER FETUS: CPT

## 2024-01-29 ENCOUNTER — NON-APPOINTMENT (OUTPATIENT)
Age: 34
End: 2024-01-29

## 2024-02-06 ENCOUNTER — APPOINTMENT (OUTPATIENT)
Dept: PEDIATRIC CARDIOLOGY | Facility: CLINIC | Age: 34
End: 2024-02-06
Payer: COMMERCIAL

## 2024-02-06 ENCOUNTER — NON-APPOINTMENT (OUTPATIENT)
Age: 34
End: 2024-02-06

## 2024-02-06 PROCEDURE — 99205 OFFICE O/P NEW HI 60 MIN: CPT | Mod: 25

## 2024-02-06 PROCEDURE — 76825 ECHO EXAM OF FETAL HEART: CPT

## 2024-02-06 PROCEDURE — 76827 ECHO EXAM OF FETAL HEART: CPT

## 2024-02-06 PROCEDURE — 93325 DOPPLER ECHO COLOR FLOW MAPG: CPT

## 2024-02-06 NOTE — HISTORY OF PRESENT ILLNESS
[FreeTextEntry1] : Dear :  I had the pleasure of seeing your patient, LAURENCE BILLINGS, in my office today, 02/06/2024. She was referred to pediatric cardiology for fetal echocardiogram.  IVF pregnancy. LAURENCE has been doing well from a clinical standpoint. There is no family history of congenital heart disease.  Today's echocardiogram was normal. Please see attached report.

## 2024-02-06 NOTE — DISCUSSION/SUMMARY
[FreeTextEntry1] : Normal fetal echocardiogram Please see report for details. Reassurance; recommend routine prenatal care and delivery.  Please do not hesitate to contact me if you have any questions.   Misael Diamond MD, MS, FAAP, FACC Attending Physician, Pediatric Cardiology [facility] [place] [place] [address] Office: [phone] Fax: [phone] Email: [email or web address]     I have spent 60 minutes of time on the encounter excluding separately reported services.

## 2024-02-08 ENCOUNTER — APPOINTMENT (OUTPATIENT)
Dept: OBGYN | Facility: CLINIC | Age: 34
End: 2024-02-08
Payer: COMMERCIAL

## 2024-02-08 VITALS — BODY MASS INDEX: 41.82 KG/M2 | TEMPERATURE: 98.3 F | WEIGHT: 213 LBS | HEIGHT: 60 IN

## 2024-02-08 PROCEDURE — 0502F SUBSEQUENT PRENATAL CARE: CPT

## 2024-02-19 ENCOUNTER — LABORATORY RESULT (OUTPATIENT)
Age: 34
End: 2024-02-19

## 2024-02-22 ENCOUNTER — NON-APPOINTMENT (OUTPATIENT)
Age: 34
End: 2024-02-22

## 2024-02-26 ENCOUNTER — APPOINTMENT (OUTPATIENT)
Dept: OBGYN | Facility: CLINIC | Age: 34
End: 2024-02-26
Payer: COMMERCIAL

## 2024-02-26 VITALS — HEIGHT: 68 IN | WEIGHT: 212 LBS | BODY MASS INDEX: 32.13 KG/M2

## 2024-02-26 DIAGNOSIS — K21.9 GASTRO-ESOPHAGEAL REFLUX DISEASE W/OUT ESOPHAGITIS: ICD-10-CM

## 2024-02-26 LAB
BILIRUB UR QL STRIP: NORMAL
CLARITY UR: CLEAR
COLLECTION METHOD: NORMAL
GLUCOSE UR-MCNC: NORMAL
HCG UR QL: 0.2 EU/DL
HGB UR QL STRIP.AUTO: NORMAL
KETONES UR-MCNC: NORMAL
LEUKOCYTE ESTERASE UR QL STRIP: NORMAL
NITRITE UR QL STRIP: NORMAL
PH UR STRIP: 6
PROT UR STRIP-MCNC: 30
SP GR UR STRIP: 1.03

## 2024-02-26 PROCEDURE — 0502F SUBSEQUENT PRENATAL CARE: CPT

## 2024-02-26 RX ORDER — OMEPRAZOLE 20 MG/1
20 CAPSULE, DELAYED RELEASE ORAL DAILY
Qty: 30 | Refills: 3 | Status: ACTIVE | COMMUNITY
Start: 2024-02-26 | End: 1900-01-01

## 2024-03-05 ENCOUNTER — NON-APPOINTMENT (OUTPATIENT)
Age: 34
End: 2024-03-05

## 2024-03-18 ENCOUNTER — NON-APPOINTMENT (OUTPATIENT)
Age: 34
End: 2024-03-18

## 2024-03-21 ENCOUNTER — APPOINTMENT (OUTPATIENT)
Dept: OBGYN | Facility: CLINIC | Age: 34
End: 2024-03-21
Payer: COMMERCIAL

## 2024-03-21 ENCOUNTER — NON-APPOINTMENT (OUTPATIENT)
Age: 34
End: 2024-03-21

## 2024-03-21 VITALS — WEIGHT: 216 LBS | BODY MASS INDEX: 32.74 KG/M2 | HEIGHT: 68 IN

## 2024-03-21 LAB
BILIRUB UR QL STRIP: NORMAL
GLUCOSE UR-MCNC: NORMAL
HCG UR QL: 0.2 EU/DL
HGB UR QL STRIP.AUTO: NORMAL
KETONES UR-MCNC: NORMAL
LEUKOCYTE ESTERASE UR QL STRIP: NORMAL
NITRITE UR QL STRIP: NORMAL
PH UR STRIP: 6
PROT UR STRIP-MCNC: NORMAL
SP GR UR STRIP: 1.03

## 2024-03-21 PROCEDURE — 0502F SUBSEQUENT PRENATAL CARE: CPT

## 2024-04-04 ENCOUNTER — APPOINTMENT (OUTPATIENT)
Dept: OBGYN | Facility: CLINIC | Age: 34
End: 2024-04-04
Payer: COMMERCIAL

## 2024-04-04 ENCOUNTER — ASOB RESULT (OUTPATIENT)
Age: 34
End: 2024-04-04

## 2024-04-04 VITALS — TEMPERATURE: 97.7 F | WEIGHT: 220 LBS | HEIGHT: 60 IN | BODY MASS INDEX: 43.19 KG/M2

## 2024-04-04 PROCEDURE — 76819 FETAL BIOPHYS PROFIL W/O NST: CPT | Mod: 59

## 2024-04-04 PROCEDURE — 0502F SUBSEQUENT PRENATAL CARE: CPT

## 2024-04-04 PROCEDURE — 76816 OB US FOLLOW-UP PER FETUS: CPT

## 2024-04-10 ENCOUNTER — NON-APPOINTMENT (OUTPATIENT)
Age: 34
End: 2024-04-10

## 2024-04-10 LAB
BASOPHILS # BLD AUTO: 0.08 K/UL
BASOPHILS NFR BLD AUTO: 0.6 %
EOSINOPHIL # BLD AUTO: 0.59 K/UL
EOSINOPHIL NFR BLD AUTO: 4.3 %
FERRITIN SERPL-MCNC: 11 NG/ML
HBV SURFACE AG SER QL: NONREACTIVE
HCT VFR BLD CALC: 36.7 %
HCV AB SER QL: NONREACTIVE
HCV S/CO RATIO: 0.1 S/CO
HGB BLD-MCNC: 11.4 G/DL
HIV1+2 AB SPEC QL IA.RAPID: NONREACTIVE
IMM GRANULOCYTES NFR BLD AUTO: 0.4 %
LYMPHOCYTES # BLD AUTO: 2.32 K/UL
LYMPHOCYTES NFR BLD AUTO: 16.8 %
MAN DIFF?: NORMAL
MCHC RBC-ENTMCNC: 25.7 PG
MCHC RBC-ENTMCNC: 31.1 G/DL
MCV RBC AUTO: 82.8 FL
MONOCYTES # BLD AUTO: 0.94 K/UL
MONOCYTES NFR BLD AUTO: 6.8 %
NEUTROPHILS # BLD AUTO: 9.82 K/UL
NEUTROPHILS NFR BLD AUTO: 71.1 %
PLATELET # BLD AUTO: 328 K/UL
PMV BLD AUTO: 0 /100 WBCS
RBC # BLD: 4.43 M/UL
RBC # FLD: 13.2 %
T PALLIDUM AB SER QL IA: NEGATIVE
WBC # FLD AUTO: 13.81 K/UL

## 2024-04-13 ENCOUNTER — NON-APPOINTMENT (OUTPATIENT)
Age: 34
End: 2024-04-13

## 2024-04-22 ENCOUNTER — APPOINTMENT (OUTPATIENT)
Dept: OBGYN | Facility: CLINIC | Age: 34
End: 2024-04-22
Payer: COMMERCIAL

## 2024-04-22 VITALS — WEIGHT: 220 LBS | BODY MASS INDEX: 43.19 KG/M2 | HEIGHT: 60 IN

## 2024-04-22 DIAGNOSIS — O09.819 SUPERVISION OF PREGNANCY RESULTING FROM ASSISTED REPRODUCTIVE TECHNOLOGY, UNSPECIFIED TRIMESTER: ICD-10-CM

## 2024-04-22 LAB
BILIRUB UR QL STRIP: NORMAL
GLUCOSE UR-MCNC: NORMAL
HCG UR QL: 0.2 EU/DL
HGB UR QL STRIP.AUTO: NORMAL
KETONES UR-MCNC: NORMAL
LEUKOCYTE ESTERASE UR QL STRIP: NORMAL
NITRITE UR QL STRIP: NORMAL
PH UR STRIP: 6
PROT UR STRIP-MCNC: NORMAL
SP GR UR STRIP: 1.02

## 2024-04-22 PROCEDURE — 0502F SUBSEQUENT PRENATAL CARE: CPT

## 2024-04-22 PROCEDURE — 90471 IMMUNIZATION ADMIN: CPT

## 2024-04-22 PROCEDURE — 90715 TDAP VACCINE 7 YRS/> IM: CPT

## 2024-04-23 ENCOUNTER — INPATIENT (INPATIENT)
Facility: HOSPITAL | Age: 34
LOS: 3 days | Discharge: HOME | DRG: 833 | End: 2024-04-27
Attending: STUDENT IN AN ORGANIZED HEALTH CARE EDUCATION/TRAINING PROGRAM | Admitting: STUDENT IN AN ORGANIZED HEALTH CARE EDUCATION/TRAINING PROGRAM
Payer: COMMERCIAL

## 2024-04-23 VITALS — SYSTOLIC BLOOD PRESSURE: 129 MMHG | HEART RATE: 112 BPM | DIASTOLIC BLOOD PRESSURE: 83 MMHG

## 2024-04-23 DIAGNOSIS — Z3A.34 34 WEEKS GESTATION OF PREGNANCY: ICD-10-CM

## 2024-04-23 DIAGNOSIS — Z90.49 ACQUIRED ABSENCE OF OTHER SPECIFIED PARTS OF DIGESTIVE TRACT: Chronic | ICD-10-CM

## 2024-04-23 DIAGNOSIS — O26.893 OTHER SPECIFIED PREGNANCY RELATED CONDITIONS, THIRD TRIMESTER: ICD-10-CM

## 2024-04-23 DIAGNOSIS — O26.899 OTHER SPECIFIED PREGNANCY RELATED CONDITIONS, UNSPECIFIED TRIMESTER: ICD-10-CM

## 2024-04-23 DIAGNOSIS — O42.90 PREMATURE RUPTURE OF MEMBRANES, UNSPECIFIED AS TO LENGTH OF TIME BETWEEN RUPTURE AND ONSET OF LABOR, UNSPECIFIED WEEKS OF GESTATION: ICD-10-CM

## 2024-04-23 LAB
BASOPHILS # BLD AUTO: 0.05 K/UL — SIGNIFICANT CHANGE UP (ref 0–0.2)
BASOPHILS NFR BLD AUTO: 0.3 % — SIGNIFICANT CHANGE UP (ref 0–1)
EOSINOPHIL # BLD AUTO: 0.32 K/UL — SIGNIFICANT CHANGE UP (ref 0–0.7)
EOSINOPHIL NFR BLD AUTO: 2.1 % — SIGNIFICANT CHANGE UP (ref 0–8)
HCT VFR BLD CALC: 36.2 % — LOW (ref 37–47)
HGB BLD-MCNC: 11.7 G/DL — LOW (ref 12–16)
IMM GRANULOCYTES NFR BLD AUTO: 0.4 % — HIGH (ref 0.1–0.3)
LYMPHOCYTES # BLD AUTO: 15.9 % — LOW (ref 20.5–51.1)
LYMPHOCYTES # BLD AUTO: 2.4 K/UL — SIGNIFICANT CHANGE UP (ref 1.2–3.4)
MCHC RBC-ENTMCNC: 25.7 PG — LOW (ref 27–31)
MCHC RBC-ENTMCNC: 32.3 G/DL — SIGNIFICANT CHANGE UP (ref 32–37)
MCV RBC AUTO: 79.6 FL — LOW (ref 81–99)
MONOCYTES # BLD AUTO: 0.81 K/UL — HIGH (ref 0.1–0.6)
MONOCYTES NFR BLD AUTO: 5.4 % — SIGNIFICANT CHANGE UP (ref 1.7–9.3)
NEUTROPHILS # BLD AUTO: 11.48 K/UL — HIGH (ref 1.4–6.5)
NEUTROPHILS NFR BLD AUTO: 75.9 % — HIGH (ref 42.2–75.2)
NRBC # BLD: 0 /100 WBCS — SIGNIFICANT CHANGE UP (ref 0–0)
PLATELET # BLD AUTO: 360 K/UL — SIGNIFICANT CHANGE UP (ref 130–400)
PMV BLD: 11.4 FL — HIGH (ref 7.4–10.4)
PRENATAL SYPHILIS TEST: SIGNIFICANT CHANGE UP
RBC # BLD: 4.55 M/UL — SIGNIFICANT CHANGE UP (ref 4.2–5.4)
RBC # FLD: 13.4 % — SIGNIFICANT CHANGE UP (ref 11.5–14.5)
WBC # BLD: 15.12 K/UL — HIGH (ref 4.8–10.8)
WBC # FLD AUTO: 15.12 K/UL — HIGH (ref 4.8–10.8)

## 2024-04-23 PROCEDURE — 86900 BLOOD TYPING SEROLOGIC ABO: CPT

## 2024-04-23 PROCEDURE — 86850 RBC ANTIBODY SCREEN: CPT

## 2024-04-23 PROCEDURE — 87661 TRICHOMONAS VAGINALIS AMPLIF: CPT

## 2024-04-23 PROCEDURE — 86592 SYPHILIS TEST NON-TREP QUAL: CPT

## 2024-04-23 PROCEDURE — 87801 DETECT AGNT MULT DNA AMPLI: CPT

## 2024-04-23 PROCEDURE — 80307 DRUG TEST PRSMV CHEM ANLYZR: CPT

## 2024-04-23 PROCEDURE — 86901 BLOOD TYPING SEROLOGIC RH(D): CPT

## 2024-04-23 PROCEDURE — 81001 URINALYSIS AUTO W/SCOPE: CPT

## 2024-04-23 PROCEDURE — 85025 COMPLETE CBC W/AUTO DIFF WBC: CPT

## 2024-04-23 PROCEDURE — 99221 1ST HOSP IP/OBS SF/LOW 40: CPT

## 2024-04-23 PROCEDURE — 87653 STREP B DNA AMP PROBE: CPT

## 2024-04-23 PROCEDURE — 80354 DRUG SCREENING FENTANYL: CPT

## 2024-04-23 PROCEDURE — 85027 COMPLETE CBC AUTOMATED: CPT

## 2024-04-23 PROCEDURE — 87186 SC STD MICRODIL/AGAR DIL: CPT

## 2024-04-23 PROCEDURE — 36415 COLL VENOUS BLD VENIPUNCTURE: CPT

## 2024-04-23 PROCEDURE — 87798 DETECT AGENT NOS DNA AMP: CPT

## 2024-04-23 PROCEDURE — 59050 FETAL MONITOR W/REPORT: CPT

## 2024-04-23 PROCEDURE — 88307 TISSUE EXAM BY PATHOLOGIST: CPT

## 2024-04-23 RX ORDER — FENTANYL/BUPIVACAINE/NS/PF 2MCG/ML-.1
250 PLASTIC BAG, INJECTION (ML) INJECTION
Refills: 0 | Status: DISCONTINUED | OUTPATIENT
Start: 2024-04-23 | End: 2024-04-27

## 2024-04-23 RX ORDER — VANCOMYCIN HCL 1 G
2000 VIAL (EA) INTRAVENOUS EVERY 8 HOURS
Refills: 0 | Status: DISCONTINUED | OUTPATIENT
Start: 2024-04-23 | End: 2024-04-24

## 2024-04-23 RX ORDER — DEXAMETHASONE 0.5 MG/5ML
4 ELIXIR ORAL EVERY 6 HOURS
Refills: 0 | Status: DISCONTINUED | OUTPATIENT
Start: 2024-04-23 | End: 2024-04-27

## 2024-04-23 RX ORDER — ONDANSETRON 8 MG/1
4 TABLET, FILM COATED ORAL EVERY 6 HOURS
Refills: 0 | Status: DISCONTINUED | OUTPATIENT
Start: 2024-04-23 | End: 2024-04-27

## 2024-04-23 RX ORDER — OXYTOCIN 10 UNIT/ML
333.33 VIAL (ML) INJECTION
Qty: 20 | Refills: 0 | Status: COMPLETED | OUTPATIENT
Start: 2024-04-23 | End: 2024-04-24

## 2024-04-23 RX ORDER — VANCOMYCIN HCL 1 G
200 VIAL (EA) INTRAVENOUS EVERY 8 HOURS
Refills: 0 | Status: DISCONTINUED | OUTPATIENT
Start: 2024-04-23 | End: 2024-04-23

## 2024-04-23 RX ORDER — NALOXONE HYDROCHLORIDE 4 MG/.1ML
0.1 SPRAY NASAL
Refills: 0 | Status: DISCONTINUED | OUTPATIENT
Start: 2024-04-23 | End: 2024-04-27

## 2024-04-23 RX ORDER — OXYTOCIN 10 UNIT/ML
2 VIAL (ML) INJECTION
Qty: 30 | Refills: 0 | Status: DISCONTINUED | OUTPATIENT
Start: 2024-04-23 | End: 2024-04-24

## 2024-04-23 RX ORDER — SODIUM CHLORIDE 9 MG/ML
1000 INJECTION, SOLUTION INTRAVENOUS
Refills: 0 | Status: DISCONTINUED | OUTPATIENT
Start: 2024-04-23 | End: 2024-04-24

## 2024-04-23 RX ADMIN — SODIUM CHLORIDE 125 MILLILITER(S): 9 INJECTION, SOLUTION INTRAVENOUS at 21:48

## 2024-04-23 RX ADMIN — Medication 2 MILLIUNIT(S)/MIN: at 22:07

## 2024-04-23 RX ADMIN — Medication 250 MILLIGRAM(S): at 22:07

## 2024-04-23 NOTE — OB PROVIDER H&P - ATTENDING COMMENTS
34 yo  at 34w5d, IVF pregnancy, h/o asthma, presents for leakage of fluid at 1700    Patient well appearing, feels some mild cramping  EFM category 1, contractions q2-4 min  Cervix 1/long/-3 grossly ruptured clear  Cephalic by ultrasound    Admit to L&D  IV fluids  Admission labs  Ampicillin for GBS prophylaxis  NICU consult  MFM consult    After MFM consult discussed with patient plan for pitocin induction of labor. Discussed risks of  delivery and patient and her wife had a good conversation with our NICU attending, all questions answered. 34 yo  at 34w5d, IVF pregnancy, h/o asthma, presents for leakage of fluid at 1700    Patient well appearing, feels some mild cramping  EFM category 1, contractions q2-4 min  Cervix 1/long/-3 grossly ruptured clear  Cephalic by ultrasound    Admit to L&D  IV fluids  Admission labs  Vancomycin for GBS prophylaxis (penicillin allergy)  NICU consult  MFM consult    After MFM consult discussed with patient plan for pitocin induction of labor. Discussed risks of  delivery and patient and her wife had a good conversation with our NICU attending, all questions answered.

## 2024-04-23 NOTE — PROCEDURE NOTE - ADDITIONAL PROCEDURE DETAILS
Thorough discussion of patient's history, as indicated above.  Discussed risks of epidural, including PDPH, inadequate analgesia occasionally requiring epidural catheter replacement, bleeding, infection and spinal cord injury.  Patient expressed understanding of these risks, signed informed consent and wishes to proceed with epidural catheter insertion.  Lumbar epidural performed at L3-4. Standard ASA monitors including BP, pulse oximetry, FHR. Sterile gloves, chlorhexidine prep. 1% lidocaine for local infiltration. 17g Tuohy. RONNY to saline at 8 cm.  Epidural catheter threaded easily to 13 cm. Tuohy needle removed. Catheter secured in place. Aspiration negative for blood/CSF. Test dose consisting of 3ml 1.5% lidocaine with epinephrine was negative. Remaining 2ml of test dose consisting of 1.5% lidocaine with epinephrine and 10 ml of 0.25% bupivacaine given incrementally after negative aspiration. Patient tolerated procedure, was hemodynamically stable throughout and did not complain of pain or paresthesias. T10 level bilaterally. Epidural infusion consisting of 250ml 0.0625% bupivacaine with fentanyl 2mcg/ml infusing at 8ml/hr. Thorough discussion of patient's history, as indicated above.  Discussed risks of epidural, including PDPH, inadequate analgesia occasionally requiring epidural catheter replacement, bleeding, infection and spinal cord injury.  Patient expressed understanding of these risks, signed informed consent and wishes to proceed with epidural catheter insertion.  Lumbar epidural performed at L3-4. Standard ASA monitors including BP, pulse oximetry, FHR. Sterile gloves, chlorhexidine prep. 1% lidocaine for local infiltration. 17g Tuohy. RONNY to saline at 8 cm.  Epidural catheter threaded easily to 13 cm. Tuohy needle removed. Catheter secured in place. Aspiration negative for blood/CSF. Test dose consisting of 3ml 1.5% lidocaine with epinephrine was negative. Remaining 2ml of test dose consisting of 1.5% lidocaine with epinephrine and 10 ml of 0.25% bupivacaine given incrementally after negative aspiration. Patient tolerated procedure, was hemodynamically stable throughout and did not complain of pain or paresthesias. T10 level bilaterally. Epidural infusion consisting of 250ml 0.0625% bupivacaine with fentanyl 2mcg/ml infusing at 8ml/hr.    Post Labor  Epidural/ Delivery  Evaluation Note:    Uncomplicated anesthetic for Vaginal Delivery.    Patient seen at bedside. Epidural to be removed by RN before patient transfer.  Patient moving B/L lower extremities.  Motor block appropriate and resolving. Vital Signs are stable. Pain well controlled.     Christiano Score greater than 9    Mental Status:  __x__ Awake   ___x__ Alert   _____ Drowsy   _____ Sedated    Nausea/Vomiting:  __x__ NO  ______Yes,   See Post - Op Orders          Pain Scale (0-10):  _____    Treatment: __X__ None       Plan: Discharge:   ____Home       __X___Floor     _____Critical Care    _____

## 2024-04-23 NOTE — OB PROVIDER H&P - ASSESSMENT
32yo  at 34w5d, GBS unknown, IVP pregnancy, h/o asthma, presents for PPROM cl @1700.   -Admit to L+D  -Monitor EFM and TOCO   -IVF and labs  -Pain control PRN  -Clear liquid diet as tolerated  -Monitor vitals  -MFM consult  -GBS ppx 32yo  at 34w5d, GBS unknown, IVF pregnancy, h/o asthma, presents for PPROM cl @1700.   -Admit to L+D  -Monitor EFM and TOCO   -IVF and labs  -Pain control PRN  -Clear liquid diet as tolerated  -Monitor vitals  -MFM consult  -GBS ppx

## 2024-04-23 NOTE — OB PROVIDER H&P - NSHPLABSRESULTS_GEN_ALL_CORE
Labs      MSAFP Pos 12/26/23  MSAFP neg 1/6/2024    HBsAG NR  MMR immune  Varicella immune  RPR neg  HIV NR  Type A pos, Abs neg    3rd Trimester  HBsAG NR  Hep C NR  RPR Neg  HIV NR    Sono  @32w - EFW 2037g (63%il), post placenta, MVP 6.08, vtx  Fetal echo normal  normal anatomy

## 2024-04-23 NOTE — CONSULT NOTE ADULT - SUBJECTIVE AND OBJECTIVE BOX
Chart reviewed and case discussed with the house staff at the request of Dr. Jett. PROM at 34 weeks with a non reactive fetal heart rate tracing.   I advise induction of labor.

## 2024-04-23 NOTE — OB RN PATIENT PROFILE - NSSDOHPHYHURT_OBGYN_A_OB
From: Miriam Chauhan  To: Zeynep David  Sent: 3/3/2021 10:22 AM CST  Subject: Non-Urgent Medical Question    Peg, thanks for the reply to my question. There was no way to reply directly to your message therefore, I had to create a new one.    Please let Dr. David know, no rash, no tenderness or increased sweating, just strong odor. I will give your suggestions a try. Thanks!    Miriam   never

## 2024-04-23 NOTE — OB PROVIDER H&P - NSHPPHYSICALEXAM_GEN_ALL_CORE
T(C): 36.7 (04-23-24 @ 20:40), Max: 36.7 (04-23-24 @ 20:16)  HR: 112 (04-23-24 @ 20:40) (112 - 112)  BP: 129/83 (04-23-24 @ 20:40) (129/83 - 129/83)  RR: 16 (04-23-24 @ 20:40) (16 - 16)  BMI (kg/m2): 43.1 (04-23-24 @ 20:40)    Gen: A+OX3. NAD  Abd: Soft, Nontender. Gravid.  SVE: 1/0/-3    FHR: 150/mod/no accel   TOCO: q2-4    EFW by Le[place]: 2800g

## 2024-04-23 NOTE — CONSULT NOTE PEDS - SUBJECTIVE AND OBJECTIVE BOX
Pt is a 34yo  female A+, HIV negative, RPR NR, Hep B negative, Rubella Immune, GBS unknown. She presents to L&D after ROM at 34w5d gestation. Pregnancy is IVF. Fetal echo normal. She was on ASA for preeclampsia precautions. Upon admission she was started on abx for unknown GBS status. As she is allergic to penicillin, she is on vancomycin. Labor augmented with pitocin. I spoke to pt at bedside with wife, mother, and mother-in-law present.     I reviewed what she should expect for her infant born at 34 weeks. I assured her that the NICU team will be present at the delivery. I explained to her that we will first assess the respiratory status of her child. I stated babies born at 34 weeks may be stable without respiratory support but many require CPAP. We have the capabilities to give any respiratory support in the DR that her infant would require including mechanical ventilation. I explained that once the infant's breathing is under control, we will transport her infant to the NICU. I stated the steps her infant needs to accomplish prior to discharge. If respiratory support is required after delivery, her infant must be weaned off all respiratory support and stable off support. As oral feedings only develop between 32-34w gestation, her infant may not initially feed all feedings by mouth. Any feeding that is not completed by mouth is completed by gavage. Her infant must complete all feeds by mouth and be gaining weight prior to discharge. And finally, her infant's temperature must be stable in an open crib prior to discharge. In general, infants born at 34 weeks usually take approximately 2 weeks accomplish these goals. She verbalized an understanding of the material presented and all questions were answered.

## 2024-04-23 NOTE — OB RN TRIAGE NOTE - FALL HARM RISK - UNIVERSAL INTERVENTIONS
Bed in lowest position, wheels locked, appropriate side rails in place/Call bell, personal items and telephone in reach/Instruct patient to call for assistance before getting out of bed or chair/Non-slip footwear when patient is out of bed/[place] to call system/Physically safe environment - no spills, clutter or unnecessary equipment/Purposeful Proactive Rounding/Room/bathroom lighting operational, light cord in reach

## 2024-04-23 NOTE — OB PROVIDER H&P - HISTORY OF PRESENT ILLNESS
34yo  at 34w5d BESS 24 by 5d FETs (donor egg), presents for leakage of clear fluid at 1700. Also endorses mild irregular cramping. Denies vaginal bleeding. Endorses good fetal movement. Pt on ASA 81mg for preclampsia precautions, last take today. Endorses h/o asthma, inhaler use PRN, no hospitalizations or intubations. Denies any other complications this pregnancy. GBS unknown. Pencillin-allergy (itchy throat).

## 2024-04-24 ENCOUNTER — RESULT REVIEW (OUTPATIENT)
Age: 34
End: 2024-04-24

## 2024-04-24 LAB
APPEARANCE UR: ABNORMAL
BILIRUB UR-MCNC: NEGATIVE — SIGNIFICANT CHANGE UP
COLOR SPEC: YELLOW — SIGNIFICANT CHANGE UP
DIFF PNL FLD: NEGATIVE — SIGNIFICANT CHANGE UP
GLUCOSE UR QL: NEGATIVE MG/DL — SIGNIFICANT CHANGE UP
KETONES UR-MCNC: 15 MG/DL
L&D DRUG SCREEN, URINE: SIGNIFICANT CHANGE UP
LEUKOCYTE ESTERASE UR-ACNC: NEGATIVE — SIGNIFICANT CHANGE UP
NITRITE UR-MCNC: NEGATIVE — SIGNIFICANT CHANGE UP
PH UR: 6 — SIGNIFICANT CHANGE UP (ref 5–8)
PROT UR-MCNC: 30 MG/DL
SP GR SPEC: 1.02 — SIGNIFICANT CHANGE UP (ref 1–1.03)
UROBILINOGEN FLD QL: 0.2 MG/DL — SIGNIFICANT CHANGE UP (ref 0.2–1)

## 2024-04-24 PROCEDURE — 88307 TISSUE EXAM BY PATHOLOGIST: CPT | Mod: 26

## 2024-04-24 PROCEDURE — 59400 OBSTETRICAL CARE: CPT

## 2024-04-24 RX ORDER — DIPHENHYDRAMINE HCL 50 MG
25 CAPSULE ORAL EVERY 6 HOURS
Refills: 0 | Status: DISCONTINUED | OUTPATIENT
Start: 2024-04-24 | End: 2024-04-27

## 2024-04-24 RX ORDER — HYDROCORTISONE 1 %
1 OINTMENT (GRAM) TOPICAL EVERY 6 HOURS
Refills: 0 | Status: DISCONTINUED | OUTPATIENT
Start: 2024-04-24 | End: 2024-04-27

## 2024-04-24 RX ORDER — AER TRAVELER 0.5 G/1
1 SOLUTION RECTAL; TOPICAL EVERY 4 HOURS
Refills: 0 | Status: DISCONTINUED | OUTPATIENT
Start: 2024-04-24 | End: 2024-04-27

## 2024-04-24 RX ORDER — IBUPROFEN 200 MG
600 TABLET ORAL EVERY 6 HOURS
Refills: 0 | Status: COMPLETED | OUTPATIENT
Start: 2024-04-24 | End: 2025-03-23

## 2024-04-24 RX ORDER — MAGNESIUM HYDROXIDE 400 MG/1
30 TABLET, CHEWABLE ORAL
Refills: 0 | Status: DISCONTINUED | OUTPATIENT
Start: 2024-04-24 | End: 2024-04-27

## 2024-04-24 RX ORDER — BENZOCAINE 10 %
1 GEL (GRAM) MUCOUS MEMBRANE ONCE
Refills: 0 | Status: DISCONTINUED | OUTPATIENT
Start: 2024-04-24 | End: 2024-04-27

## 2024-04-24 RX ORDER — LANOLIN
1 OINTMENT (GRAM) TOPICAL EVERY 6 HOURS
Refills: 0 | Status: DISCONTINUED | OUTPATIENT
Start: 2024-04-24 | End: 2024-04-27

## 2024-04-24 RX ORDER — PRAMOXINE HYDROCHLORIDE 150 MG/15G
1 AEROSOL, FOAM RECTAL EVERY 4 HOURS
Refills: 0 | Status: DISCONTINUED | OUTPATIENT
Start: 2024-04-24 | End: 2024-04-27

## 2024-04-24 RX ORDER — DIBUCAINE 1 %
1 OINTMENT (GRAM) RECTAL EVERY 6 HOURS
Refills: 0 | Status: DISCONTINUED | OUTPATIENT
Start: 2024-04-24 | End: 2024-04-27

## 2024-04-24 RX ORDER — IBUPROFEN 200 MG
600 TABLET ORAL EVERY 6 HOURS
Refills: 0 | Status: DISCONTINUED | OUTPATIENT
Start: 2024-04-24 | End: 2024-04-27

## 2024-04-24 RX ORDER — BENZOCAINE 10 %
1 GEL (GRAM) MUCOUS MEMBRANE EVERY 6 HOURS
Refills: 0 | Status: DISCONTINUED | OUTPATIENT
Start: 2024-04-24 | End: 2024-04-27

## 2024-04-24 RX ORDER — KETOROLAC TROMETHAMINE 30 MG/ML
30 SYRINGE (ML) INJECTION ONCE
Refills: 0 | Status: DISCONTINUED | OUTPATIENT
Start: 2024-04-24 | End: 2024-04-24

## 2024-04-24 RX ORDER — TETANUS TOXOID, REDUCED DIPHTHERIA TOXOID AND ACELLULAR PERTUSSIS VACCINE, ADSORBED 5; 2.5; 8; 8; 2.5 [IU]/.5ML; [IU]/.5ML; UG/.5ML; UG/.5ML; UG/.5ML
0.5 SUSPENSION INTRAMUSCULAR ONCE
Refills: 0 | Status: DISCONTINUED | OUTPATIENT
Start: 2024-04-24 | End: 2024-04-27

## 2024-04-24 RX ORDER — OXYCODONE HYDROCHLORIDE 5 MG/1
5 TABLET ORAL
Refills: 0 | Status: DISCONTINUED | OUTPATIENT
Start: 2024-04-24 | End: 2024-04-27

## 2024-04-24 RX ORDER — OXYCODONE HYDROCHLORIDE 5 MG/1
5 TABLET ORAL ONCE
Refills: 0 | Status: DISCONTINUED | OUTPATIENT
Start: 2024-04-24 | End: 2024-04-27

## 2024-04-24 RX ORDER — ACETAMINOPHEN 500 MG
975 TABLET ORAL
Refills: 0 | Status: DISCONTINUED | OUTPATIENT
Start: 2024-04-24 | End: 2024-04-27

## 2024-04-24 RX ORDER — OXYTOCIN 10 UNIT/ML
41.67 VIAL (ML) INJECTION
Qty: 20 | Refills: 0 | Status: DISCONTINUED | OUTPATIENT
Start: 2024-04-24 | End: 2024-04-27

## 2024-04-24 RX ORDER — SIMETHICONE 80 MG/1
80 TABLET, CHEWABLE ORAL EVERY 4 HOURS
Refills: 0 | Status: DISCONTINUED | OUTPATIENT
Start: 2024-04-24 | End: 2024-04-27

## 2024-04-24 RX ORDER — SODIUM CHLORIDE 9 MG/ML
3 INJECTION INTRAMUSCULAR; INTRAVENOUS; SUBCUTANEOUS EVERY 8 HOURS
Refills: 0 | Status: DISCONTINUED | OUTPATIENT
Start: 2024-04-24 | End: 2024-04-27

## 2024-04-24 RX ADMIN — Medication 1000 MILLIUNIT(S)/MIN: at 13:27

## 2024-04-24 RX ADMIN — Medication 975 MILLIGRAM(S): at 22:55

## 2024-04-24 RX ADMIN — ONDANSETRON 4 MILLIGRAM(S): 8 TABLET, FILM COATED ORAL at 04:45

## 2024-04-24 RX ADMIN — Medication 975 MILLIGRAM(S): at 22:25

## 2024-04-24 RX ADMIN — Medication 30 MILLIGRAM(S): at 14:00

## 2024-04-24 RX ADMIN — Medication 250 MILLIGRAM(S): at 06:00

## 2024-04-24 NOTE — OB PROVIDER LABOR PROGRESS NOTE - NS_SUBJECTIVE/OBJECTIVE_OBGYN_ALL_OB_FT
Pt was seen and examined at bedside at 0730, resting comfortably, pain well controlled.  FHR tracing noted to be category 2 with variables.  pitocin stopped earlier at 0440, continues to remain off  Vital Signs (24 Hrs):  T(C): 36.7 (24 @ 07:04), Max: 36.8 (24 @ 04:03)  HR: 89 (24 @ 08:14) (75 - 132)  BP: 92/53 (24 @ 08:13) (77/47 - 155/90)  RR: 19 (24 @ 04:52) (16 - 19)  SpO2: 95% (24 @ 08:14) (91% - 99%)    VE: 3.5/90/-2  FSE in place  FHR: 140/mod/+accels; variables noted  TOCO:  q 4-5 min     a/p: 33 yr old  at 34w5d Rh POS GBS unknown on Vanco with Category 2 tracing     continue to monoitor toco and EFM  reevaluate progression and restart pitocin as clinically indicated    Dr Valle aware

## 2024-04-24 NOTE — OB RN DELIVERY SUMMARY - NSSELHIDDEN_OBGYN_ALL_OB_FT
[NS_DeliveryAttending1_OBGYN_ALL_OB_FT:[id number]=] [NS_DeliveryAttending1_OBGYN_ALL_OB_FT:[id number]=],[NS_DeliveryRN_OBGYN_ALL_OB_FT:[email or web address]=]

## 2024-04-24 NOTE — OB PROVIDER DELIVERY SUMMARY - NSSELHIDDEN_OBGYN_ALL_OB_FT
[NS_DeliveryAttending1_OBGYN_ALL_OB_FT:[id number]=],[NS_DeliveryRN_OBGYN_ALL_OB_FT:[email or web address]=]

## 2024-04-24 NOTE — OB PROVIDER DELIVERY SUMMARY - NSPROVIDERDELIVERYNOTE_OBGYN_ALL_OB_FT
IN BIRTHING ROOM. PEDIATRICS CALLED FOR PPROM AT 34 WKS 6 DAYS.  ATRAUMATIC  BABY BOY OVER INTACT PERINEUM.  BABY SUCTIONED AND STIMULATED AND CRIED SPONTANEOUSLY.  AFTER DELAYED CORD CLAMPING AS PER PROTOCOL, CORD CLAMPED X 2 AND CUT AND BABY HANDED TO AWAITING PEDIATRIC PERSONAL.  CORD BLOODS TAKEN FOR GASES AND ROUTINE AND STEM CELLS AND TISSUE FOR STEM CELLS COLLECTED AS PER PATIENT REQUEST.  PITOCIN 20 UNITS IN 1000 CC RL RUN RAPIDLY AND PLACENTA DELIVERED SPONTANEOUSLY AND APPEARS INTACT. BATTLEDORE INSERTION . UTERUS CONTRACTED DOWN WELL AND UTERINE FUNDUS FIRM AND WELL  BELOW UMBILICUS. INSPECTION OF VAGINA , CERVIX AND PERINEUM WITHOUT ANY TEARS.  APGARS 9-9.   CC. WEIGHT 6'5".

## 2024-04-24 NOTE — OB RN DELIVERY SUMMARY - NS_[place]NAALIVE_OBGYN_ALL_OB
[facility]

## 2024-04-24 NOTE — PROGRESS NOTE ADULT - SUBJECTIVE AND OBJECTIVE BOX
PGY 1 Note    S: Patient seen and examined at bedside for labor evaluation. Doing well, pain well controlled on epidural, no complaints    Vital Signs Last 24 Hrs  T(C): 36.7 (23 Apr 2024 20:40), Max: 36.7 (23 Apr 2024 20:16)  T(F): 98.1 (23 Apr 2024 20:40), Max: 98.1 (23 Apr 2024 20:16)  HR: 113 (24 Apr 2024 00:39) (81 - 132)  BP: 92/55 (24 Apr 2024 00:39) (80/42 - 155/90)  RR: 18 (23 Apr 2024 22:36) (16 - 18)  SpO2: 98% (24 Apr 2024 00:21) (91% - 99%)        EFM: 140/mod/+accel/isolated variable  TOCO: q3-4min  SVE: 1/0/-3    Labs:                        11.7   15.12 )-----------( 360      ( 23 Apr 2024 21:40 )             36.2             ABO RH Interpretation: A POS (04-23-24 @ 21:40)        Prenatal Syphilis Test: Nonreact (04-23-24 @ 21:40)      Medications:  ABO RH Interpretation: A POS (04-23-24 @ 21:40)     Pitocin: @6mU  Epi: in place

## 2024-04-24 NOTE — PROGRESS NOTE ADULT - SUBJECTIVE AND OBJECTIVE BOX
PGY 2 Note    Patient seen at bedside for evaluation of labor progression and variable decels. Pt repositioned to left lateral. Pitocin 12 --> 6u.     T(F): --  HR: 105 (02:58)  BP: 108/58 (02:58)  RR: 18 (22:36)    EFM: 140/mod/+accels/+variable decels  TOCO: q3m  SVE: deferred, 1/0/-3 @0040    Labs:                        11.7   15.12 )-----------( 360      ( 23 Apr 2024 21:40 )             36.2           ABO RH Interpretation: A POS (04-23-24 @ 21:40)          Meds: fentanyl (2 MICROgram(s)/mL) + bupivacaine 0.0625%  in 0.9% Sodium Chloride PCEA 250 milliLiter(s) Epidural PCA Continuous  lactated ringers. 1000 milliLiter(s) IV Continuous <Continuous>  oxytocin Infusion 333.333 milliUNIT(s)/Min IV Continuous <Continuous>  oxytocin Infusion. 2 milliUNIT(s)/Min IV Continuous <Continuous>  vancomycin  IVPB 2000 milliGRAM(s) IV Intermittent every 8 hours   PGY 2 Note    Patient seen at bedside for evaluation of labor progression and variable decels. Pt repositioned to left lateral. Pitocin 12 --> 6u.       HR: 105 (02:58)  BP: 108/58 (02:58)  RR: 18 (22:36)    EFM: 140/mod/+accels/+variable decels  TOCO: q3m  SVE: deferred, 1/0/-3 @0040    Labs:                        11.7   15.12 )-----------( 360      ( 23 Apr 2024 21:40 )             36.2           ABO RH Interpretation: A POS (04-23-24 @ 21:40)          Meds: fentanyl (2 MICROgram(s)/mL) + bupivacaine 0.0625%  in 0.9% Sodium Chloride PCEA 250 milliLiter(s) Epidural PCA Continuous  lactated ringers. 1000 milliLiter(s) IV Continuous <Continuous>  oxytocin Infusion 333.333 milliUNIT(s)/Min IV Continuous <Continuous>  oxytocin Infusion. 2 milliUNIT(s)/Min IV Continuous <Continuous>  vancomycin  IVPB 2000 milliGRAM(s) IV Intermittent every 8 hours

## 2024-04-25 LAB
BASOPHILS # BLD AUTO: 0.06 K/UL — SIGNIFICANT CHANGE UP (ref 0–0.2)
BASOPHILS NFR BLD AUTO: 0.4 % — SIGNIFICANT CHANGE UP (ref 0–1)
EOSINOPHIL # BLD AUTO: 0.45 K/UL — SIGNIFICANT CHANGE UP (ref 0–0.7)
EOSINOPHIL NFR BLD AUTO: 2.8 % — SIGNIFICANT CHANGE UP (ref 0–8)
GROUP B BETA STREP DNA (PCR): DETECTED
HCT VFR BLD CALC: 35.8 % — LOW (ref 37–47)
HGB BLD-MCNC: 11.4 G/DL — LOW (ref 12–16)
IMM GRANULOCYTES NFR BLD AUTO: 0.6 % — HIGH (ref 0.1–0.3)
LYMPHOCYTES # BLD AUTO: 15.8 % — LOW (ref 20.5–51.1)
LYMPHOCYTES # BLD AUTO: 2.5 K/UL — SIGNIFICANT CHANGE UP (ref 1.2–3.4)
MCHC RBC-ENTMCNC: 25.7 PG — LOW (ref 27–31)
MCHC RBC-ENTMCNC: 31.8 G/DL — LOW (ref 32–37)
MCV RBC AUTO: 80.8 FL — LOW (ref 81–99)
MONOCYTES # BLD AUTO: 0.9 K/UL — HIGH (ref 0.1–0.6)
MONOCYTES NFR BLD AUTO: 5.7 % — SIGNIFICANT CHANGE UP (ref 1.7–9.3)
NEUTROPHILS # BLD AUTO: 11.86 K/UL — HIGH (ref 1.4–6.5)
NEUTROPHILS NFR BLD AUTO: 74.7 % — SIGNIFICANT CHANGE UP (ref 42.2–75.2)
NRBC # BLD: 0 /100 WBCS — SIGNIFICANT CHANGE UP (ref 0–0)
PLATELET # BLD AUTO: 281 K/UL — SIGNIFICANT CHANGE UP (ref 130–400)
PMV BLD: 11.4 FL — HIGH (ref 7.4–10.4)
RBC # BLD: 4.43 M/UL — SIGNIFICANT CHANGE UP (ref 4.2–5.4)
RBC # FLD: 13.4 % — SIGNIFICANT CHANGE UP (ref 11.5–14.5)
SOURCE GROUP B STREP: SIGNIFICANT CHANGE UP
SURGICAL PATHOLOGY STUDY: SIGNIFICANT CHANGE UP
WBC # BLD: 15.87 K/UL — HIGH (ref 4.8–10.8)
WBC # FLD AUTO: 15.87 K/UL — HIGH (ref 4.8–10.8)

## 2024-04-25 RX ADMIN — SODIUM CHLORIDE 3 MILLILITER(S): 9 INJECTION INTRAMUSCULAR; INTRAVENOUS; SUBCUTANEOUS at 05:56

## 2024-04-25 RX ADMIN — Medication 600 MILLIGRAM(S): at 06:26

## 2024-04-25 RX ADMIN — Medication 600 MILLIGRAM(S): at 18:19

## 2024-04-25 RX ADMIN — Medication 600 MILLIGRAM(S): at 00:58

## 2024-04-25 RX ADMIN — SODIUM CHLORIDE 3 MILLILITER(S): 9 INJECTION INTRAMUSCULAR; INTRAVENOUS; SUBCUTANEOUS at 13:20

## 2024-04-25 RX ADMIN — Medication 975 MILLIGRAM(S): at 22:00

## 2024-04-25 RX ADMIN — Medication 600 MILLIGRAM(S): at 11:46

## 2024-04-25 RX ADMIN — SODIUM CHLORIDE 3 MILLILITER(S): 9 INJECTION INTRAMUSCULAR; INTRAVENOUS; SUBCUTANEOUS at 00:02

## 2024-04-25 RX ADMIN — Medication 600 MILLIGRAM(S): at 12:45

## 2024-04-25 RX ADMIN — Medication 975 MILLIGRAM(S): at 21:52

## 2024-04-25 RX ADMIN — Medication 1 TABLET(S): at 11:46

## 2024-04-25 NOTE — PROGRESS NOTE ADULT - SUBJECTIVE AND OBJECTIVE BOX
OB Attending Post Partum Note    Subjective: Patient seen and examined at bedside. Doing well. Minimal pain and vaginal bleeding. Denies fever, chills, CP, SOB, N/V, LE pain. She is ambulating, voiding, passing flatus, tolerating regular diet, breast pumping.     Physical exam:    Vital Signs Last 24 Hrs  T(C): 36.7 (25 Apr 2024 07:44), Max: 37.3 (24 Apr 2024 13:03)  T(F): 98 (25 Apr 2024 07:44), Max: 99.1 (24 Apr 2024 13:03)  HR: 69 (25 Apr 2024 07:44) (69 - 150)  BP: 118/74 (25 Apr 2024 07:44) (112/58 - 126/66)  BP(mean): --  RR: 18 (25 Apr 2024 07:44) (18 - 18)  SpO2: 96% (24 Apr 2024 13:10) (90% - 100%)        Gen: NAD  CVS: s1s2, rrr  Lungs: ctab, no rhonchi or rales  Abdomen: Soft, nontender, no distension , firm uterine fundus below umbilicus.  Pelvic: Normal lochia noted  Ext: No calf tenderness    Diet: regular  meds:   (ADM OVERRIDE)   1 Each &lt;See Task&gt; (04-24-24 @ 13:21)    acetaminophen     Tablet ..   975 milliGRAM(s) Oral (04-24-24 @ 22:25)    ibuprofen  Tablet.   600 milliGRAM(s) Oral (04-25-24 @ 11:46)   600 milliGRAM(s) Oral (04-25-24 @ 06:26)   600 milliGRAM(s) Oral (04-25-24 @ 00:58)    ketorolac   Injectable   30 milliGRAM(s) IV Push (04-24-24 @ 14:00)    prenatal multivitamin   1 Tablet(s) Oral (04-25-24 @ 11:46)    sodium chloride 0.9% lock flush   3 milliLiter(s) IV Push (04-25-24 @ 05:56)   3 milliLiter(s) IV Push (04-25-24 @ 00:02)        LABS:                        11.4   15.87 )-----------( 281      ( 25 Apr 2024 08:35 )             35.8                         11.7   15.12 )-----------( 360      ( 23 Apr 2024 21:40 )             36.2

## 2024-04-25 NOTE — PROGRESS NOTE ADULT - ASSESSMENT
34yo  at 34w5d, GBS unknown, IVP pregnancy, h/o asthma, for augmentation for PPROM cl @1700, on pitocin, intermittent cat II tracing  - current management, pit @6mU  - Cont EFM/[place]  - Clear Liquids & IV Hydration  - Pain Management prn, s/p epidural  - Monitor vitals  - vancomycin for GBS ppx (penicillin-allergy)  -continue resuscitative measures    d/w Dr. Bowen and Dr. Jett
34yo  at 34w5d, GBS unknown, IVP pregnancy, h/o asthma, for augmentation for PPROM cl @1700, on pitocin  - current management, pit @6mU  - Cont EFM/[place]  - Clear Liquids & IV Hydration  - Pain Management prn, s/p epidural  - Monitor vitals  - vancomycin for GBS ppx (penicillin-allergy)
32 yo now P1, s/p uncomplicated , ex34.5wk, h/o asthma, PPD1, doing well.      - pain management PRN  - monitor vitals and bleeding  - regular diet, PO hydration  - ambulation  - routine postpartum care

## 2024-04-26 ENCOUNTER — TRANSCRIPTION ENCOUNTER (OUTPATIENT)
Age: 34
End: 2024-04-26

## 2024-04-26 LAB
HCT VFR BLD CALC: 33.5 % — LOW (ref 37–47)
HGB BLD-MCNC: 10.4 G/DL — LOW (ref 12–16)
MCHC RBC-ENTMCNC: 25.4 PG — LOW (ref 27–31)
MCHC RBC-ENTMCNC: 31 G/DL — LOW (ref 32–37)
MCV RBC AUTO: 81.7 FL — SIGNIFICANT CHANGE UP (ref 81–99)
NRBC # BLD: 0 /100 WBCS — SIGNIFICANT CHANGE UP (ref 0–0)
PLATELET # BLD AUTO: 312 K/UL — SIGNIFICANT CHANGE UP (ref 130–400)
PMV BLD: 11.1 FL — HIGH (ref 7.4–10.4)
RBC # BLD: 4.1 M/UL — LOW (ref 4.2–5.4)
RBC # FLD: 13.2 % — SIGNIFICANT CHANGE UP (ref 11.5–14.5)
WBC # BLD: 10.7 K/UL — SIGNIFICANT CHANGE UP (ref 4.8–10.8)
WBC # FLD AUTO: 10.7 K/UL — SIGNIFICANT CHANGE UP (ref 4.8–10.8)

## 2024-04-26 RX ORDER — IBUPROFEN 200 MG
1 TABLET ORAL
Qty: 0 | Refills: 0 | DISCHARGE
Start: 2024-04-26

## 2024-04-26 RX ORDER — ACETAMINOPHEN 500 MG
3 TABLET ORAL
Qty: 0 | Refills: 0 | DISCHARGE
Start: 2024-04-26

## 2024-04-26 RX ORDER — HYDROCORTISONE 1 %
1 OINTMENT (GRAM) TOPICAL DAILY
Refills: 0 | Status: DISCONTINUED | OUTPATIENT
Start: 2024-04-26 | End: 2024-04-27

## 2024-04-26 RX ADMIN — Medication 600 MILLIGRAM(S): at 00:00

## 2024-04-26 RX ADMIN — SODIUM CHLORIDE 3 MILLILITER(S): 9 INJECTION INTRAMUSCULAR; INTRAVENOUS; SUBCUTANEOUS at 00:46

## 2024-04-26 RX ADMIN — Medication 1 APPLICATION(S): at 19:06

## 2024-04-26 RX ADMIN — SODIUM CHLORIDE 3 MILLILITER(S): 9 INJECTION INTRAMUSCULAR; INTRAVENOUS; SUBCUTANEOUS at 13:39

## 2024-04-26 RX ADMIN — SODIUM CHLORIDE 3 MILLILITER(S): 9 INJECTION INTRAMUSCULAR; INTRAVENOUS; SUBCUTANEOUS at 07:16

## 2024-04-26 RX ADMIN — Medication 600 MILLIGRAM(S): at 18:28

## 2024-04-26 RX ADMIN — Medication 600 MILLIGRAM(S): at 13:39

## 2024-04-26 RX ADMIN — Medication 1 TABLET(S): at 13:39

## 2024-04-26 RX ADMIN — Medication 600 MILLIGRAM(S): at 17:15

## 2024-04-26 RX ADMIN — Medication 975 MILLIGRAM(S): at 21:19

## 2024-04-26 RX ADMIN — Medication 600 MILLIGRAM(S): at 19:01

## 2024-04-26 RX ADMIN — Medication 600 MILLIGRAM(S): at 06:28

## 2024-04-26 RX ADMIN — Medication 975 MILLIGRAM(S): at 09:45

## 2024-04-26 RX ADMIN — Medication 600 MILLIGRAM(S): at 06:58

## 2024-04-26 RX ADMIN — SODIUM CHLORIDE 3 MILLILITER(S): 9 INJECTION INTRAMUSCULAR; INTRAVENOUS; SUBCUTANEOUS at 22:00

## 2024-04-26 RX ADMIN — Medication 1 APPLICATION(S): at 21:20

## 2024-04-26 RX ADMIN — Medication 975 MILLIGRAM(S): at 22:00

## 2024-04-26 RX ADMIN — Medication 600 MILLIGRAM(S): at 00:37

## 2024-04-26 NOTE — DISCHARGE NOTE OB - PATIENT PORTAL LINK FT
You can access the FollowMyHealth Patient Portal offered by [facility] by registering at the following website: [email or web address]. By joining [facility]’s FollowMyHealth portal, you will also be able to view your health information using other applications (apps) compatible with our system.

## 2024-04-26 NOTE — PROGRESS NOTE ADULT - SUBJECTIVE AND OBJECTIVE BOX
STATUS POST  34 WKS 6 DAYS BABY BOY 6'5" 2024  AFEBRILE AND VS STABLE   DOING WELL BUT TODAY WITH SOME SWELLING OF HER FEET. ALSO NOTICING SOME LIGHTHEADED SENSATION ON STANDING AND WALKING.  NO COMPLAINTS OF HEAVY BLEEDING. NO COMPLIANTS OF DIFFICULTY BREATHING.  PE;ABDOMEN;SOFT, NON TENDER      LOCHIA LIGHT TO MODERATE      PEDAL EDEMA NOTED (NOT UNEXPECTED)    IMP; STATUS POST  DOING WELL EXCEPT FOR SOME EDEMA OF FEET AND LIGHTHEADED EPISODES    PLAN; CBC NEXT ROUNDS; CLOSE OBSERVATION            PROBABLE DISCHARGE TOMORROW.

## 2024-04-26 NOTE — DISCHARGE NOTE OB - MEDICATION SUMMARY - MEDICATIONS TO TAKE
I will START or STAY ON the medications listed below when I get home from the hospital:    predniSONE 20 mg oral tablet  -- 2 tab(s) by mouth once a day   -- It is very important that you take or use this exactly as directed.  Do not skip doses or discontinue unless directed by your doctor.  Obtain medical advice before taking any non-prescription drugs as some may affect the action of this medication.  Take with food or milk.    -- Indication: For Home med    ibuprofen 600 mg oral tablet  -- 1 tab(s) by mouth every 6 hours as needed for pain  -- Indication: For Pain    acetaminophen 325 mg oral tablet  -- 3 tab(s) by mouth every 6 hours as needed for pain  -- Indication: For Pain    ipratropium-albuterol 0.5 mg-2.5 mg/3 mLinhalation solution  -- 3 milliliter(s) by nebulizer 3 times a day   -- For inhalation only.  It is very important that you take or use this exactly as directed.  Do not skip doses or discontinue unless directed by your doctor.  Obtain medical advice before taking any non-prescription drugs as some may affect the action of this medication.    -- Indication: For asthma    Prenatal Multivitamins with Folic Acid 1 mg oral tablet  -- 1 tab(s) by mouth once a day  -- Indication: For Postpartum

## 2024-04-26 NOTE — DISCHARGE NOTE OB - NS MD DC FALL RISK RISK
For information on Fall & Injury Prevention, visit: [email or web address] OR  [email or web address] OR  https://www.cdc.gov/steadi/patient.html

## 2024-04-26 NOTE — DISCHARGE NOTE OB - CARE PROVIDER_API CALL
Chava Valle  Obstetrics and Gynecology  [address] [place] [id number]  Phone: [phone]  Fax: [phone]  Established Patient  Follow Up Time:

## 2024-04-27 VITALS
DIASTOLIC BLOOD PRESSURE: 80 MMHG | TEMPERATURE: 98 F | SYSTOLIC BLOOD PRESSURE: 130 MMHG | HEART RATE: 76 BPM | RESPIRATION RATE: 18 BRPM

## 2024-04-27 LAB
A VAGINAE DNA VAG QL NAA+PROBE: SIGNIFICANT CHANGE UP
BVAB2 DNA VAG QL NAA+PROBE: SIGNIFICANT CHANGE UP
C ALBICANS DNA VAG QL NAA+PROBE: NEGATIVE — SIGNIFICANT CHANGE UP
C GLABRATA DNA VAG QL NAA+PROBE: NEGATIVE — SIGNIFICANT CHANGE UP
C KRUSEI DNA VAG QL NAA+PROBE: NEGATIVE — SIGNIFICANT CHANGE UP
C LUSITANIAE DNA VAG QL NAA+PROBE: NEGATIVE — SIGNIFICANT CHANGE UP
CANDIDA DNA VAG QL NAA+PROBE: NEGATIVE — SIGNIFICANT CHANGE UP
MEGA1 DNA VAG QL NAA+PROBE: ABNORMAL
T VAGINALIS RRNA SPEC QL NAA+PROBE: NEGATIVE — SIGNIFICANT CHANGE UP

## 2024-04-27 RX ADMIN — SODIUM CHLORIDE 3 MILLILITER(S): 9 INJECTION INTRAMUSCULAR; INTRAVENOUS; SUBCUTANEOUS at 06:30

## 2024-04-27 RX ADMIN — Medication 975 MILLIGRAM(S): at 04:00

## 2024-04-27 RX ADMIN — Medication 975 MILLIGRAM(S): at 15:25

## 2024-04-27 RX ADMIN — Medication 600 MILLIGRAM(S): at 00:06

## 2024-04-27 RX ADMIN — Medication 600 MILLIGRAM(S): at 06:20

## 2024-04-27 RX ADMIN — Medication 975 MILLIGRAM(S): at 03:11

## 2024-04-27 RX ADMIN — Medication 600 MILLIGRAM(S): at 01:00

## 2024-04-27 NOTE — PROGRESS NOTE ADULT - SUBJECTIVE AND OBJECTIVE BOX
STATUS POST  BABY BOY 6LBS 5 OZ 2024 AFTER PPROM  DOING WELL. NO COMPLAINTS NOW  AFEBRILE AND VS STABLE  AMBLATING , VOIDING WITHOUT COMPLAINTS; TOLERATING DIET.  STARTING TO NURSE AND BABY LATCHING WELL. (PT HAD BEEN PUMPING)  PE; PEDAL EDEMA STILL PRESENT BUT STABLE AND NOT UNEXPECTED  HG FROM YESTERDAY 10.4  =STABLE    IMP; POST PARTUM DOING WELL    PLAN; DISCHARGE HOME; ALL INSTRUCTIONS GIVEN.  IBUPROFEN OR ACETOMINOPHEN FOR PAIN           RETURN TO OFFICE 6 WKS OR PRN

## 2024-04-30 ENCOUNTER — APPOINTMENT (OUTPATIENT)
Dept: OBGYN | Facility: CLINIC | Age: 34
End: 2024-04-30

## 2024-05-06 DIAGNOSIS — E28.2 POLYCYSTIC OVARIAN SYNDROME: ICD-10-CM

## 2024-05-06 DIAGNOSIS — J45.909 UNSPECIFIED ASTHMA, UNCOMPLICATED: ICD-10-CM

## 2024-05-06 DIAGNOSIS — R60.9 EDEMA, UNSPECIFIED: ICD-10-CM

## 2024-05-06 DIAGNOSIS — Z88.0 ALLERGY STATUS TO PENICILLIN: ICD-10-CM

## 2024-05-06 DIAGNOSIS — Z3A.34 34 WEEKS GESTATION OF PREGNANCY: ICD-10-CM

## 2024-05-06 DIAGNOSIS — Z79.82 LONG TERM (CURRENT) USE OF ASPIRIN: ICD-10-CM

## 2024-05-08 ENCOUNTER — APPOINTMENT (OUTPATIENT)
Dept: OBGYN | Facility: CLINIC | Age: 34
End: 2024-05-08

## 2024-05-21 ENCOUNTER — NON-APPOINTMENT (OUTPATIENT)
Age: 34
End: 2024-05-21

## 2024-07-03 ENCOUNTER — APPOINTMENT (OUTPATIENT)
Dept: OBGYN | Facility: CLINIC | Age: 34
End: 2024-07-03
Payer: COMMERCIAL

## 2024-07-03 VITALS — WEIGHT: 206 LBS | BODY MASS INDEX: 40.44 KG/M2 | TEMPERATURE: 98 F | HEIGHT: 60 IN

## 2024-07-03 PROCEDURE — 0503F POSTPARTUM CARE VISIT: CPT

## 2024-07-10 ENCOUNTER — NON-APPOINTMENT (OUTPATIENT)
Age: 34
End: 2024-07-10

## 2024-12-09 ENCOUNTER — LABORATORY RESULT (OUTPATIENT)
Age: 34
End: 2024-12-09

## 2024-12-09 ENCOUNTER — APPOINTMENT (OUTPATIENT)
Dept: OBGYN | Facility: CLINIC | Age: 34
End: 2024-12-09
Payer: COMMERCIAL

## 2024-12-09 VITALS — TEMPERATURE: 96.2 F | HEIGHT: 62 IN | WEIGHT: 207 LBS | BODY MASS INDEX: 38.09 KG/M2

## 2024-12-09 DIAGNOSIS — Z01.419 ENCOUNTER FOR GYNECOLOGICAL EXAMINATION (GENERAL) (ROUTINE) W/OUT ABNORMAL FINDINGS: ICD-10-CM

## 2024-12-09 LAB
APPEARANCE: CLEAR
BILIRUBIN URINE: NEGATIVE
BLOOD URINE: NEGATIVE
COLOR: YELLOW
GLUCOSE QUALITATIVE U: NEGATIVE
KETONES URINE: NEGATIVE
LEUKOCYTE ESTERASE URINE: NEGATIVE
NITRITE URINE: NEGATIVE
PH URINE: 6
PROTEIN URINE: NEGATIVE
SPECIFIC GRAVITY URINE: 1.02
UROBILINOGEN URINE: 0.2 (ref 0.2–?)

## 2024-12-09 PROCEDURE — 99395 PREV VISIT EST AGE 18-39: CPT

## 2025-08-25 ENCOUNTER — NON-APPOINTMENT (OUTPATIENT)
Age: 35
End: 2025-08-25

## 2025-08-27 ENCOUNTER — LABORATORY RESULT (OUTPATIENT)
Age: 35
End: 2025-08-27

## 2025-08-27 ENCOUNTER — APPOINTMENT (OUTPATIENT)
Dept: OBGYN | Facility: CLINIC | Age: 35
End: 2025-08-27
Payer: COMMERCIAL

## 2025-08-27 VITALS — HEIGHT: 60 IN | WEIGHT: 211 LBS | TEMPERATURE: 97.6 F | BODY MASS INDEX: 41.43 KG/M2

## 2025-08-27 DIAGNOSIS — Z01.419 ENCOUNTER FOR GYNECOLOGICAL EXAMINATION (GENERAL) (ROUTINE) W/OUT ABNORMAL FINDINGS: ICD-10-CM

## 2025-08-27 DIAGNOSIS — N91.1 SECONDARY AMENORRHEA: ICD-10-CM

## 2025-08-27 LAB
APPEARANCE: CLEAR
BILIRUBIN URINE: NEGATIVE
BLOOD URINE: ABNORMAL
COLOR: YELLOW
GLUCOSE QUALITATIVE U: NEGATIVE
KETONES URINE: NEGATIVE
LEUKOCYTE ESTERASE URINE: ABNORMAL
NITRITE URINE: NEGATIVE
PH URINE: 7
PROTEIN URINE: NEGATIVE
SPECIFIC GRAVITY URINE: 1.02
UROBILINOGEN URINE: 0.2 (ref 0.2–?)

## 2025-08-27 PROCEDURE — 99213 OFFICE O/P EST LOW 20 MIN: CPT

## 2025-09-01 PROBLEM — N91.1 AMENORRHEA, SECONDARY: Status: ACTIVE | Noted: 2025-09-01

## 2025-09-04 ENCOUNTER — NON-APPOINTMENT (OUTPATIENT)
Age: 35
End: 2025-09-04

## 2025-09-04 ENCOUNTER — APPOINTMENT (OUTPATIENT)
Dept: OBGYN | Facility: CLINIC | Age: 35
End: 2025-09-04
Payer: COMMERCIAL

## 2025-09-04 ENCOUNTER — ASOB RESULT (OUTPATIENT)
Age: 35
End: 2025-09-04

## 2025-09-04 DIAGNOSIS — O21.9 VOMITING OF PREGNANCY, UNSPECIFIED: ICD-10-CM

## 2025-09-04 PROCEDURE — 76813 OB US NUCHAL MEAS 1 GEST: CPT | Mod: TC

## 2025-09-04 RX ORDER — ONDANSETRON 4 MG/1
4 TABLET, ORALLY DISINTEGRATING ORAL
Qty: 30 | Refills: 2 | Status: ACTIVE | COMMUNITY
Start: 2025-09-04 | End: 1900-01-01

## 2025-09-05 RX ORDER — DOXYLAMINE SUCCINATE AND PYRIDOXINE HYDROCHLORIDE 10; 10 MG/1; MG/1
10-10 TABLET, DELAYED RELEASE ORAL
Qty: 60 | Refills: 1 | Status: ACTIVE | COMMUNITY
Start: 2025-09-05 | End: 1900-01-01

## 2025-09-15 ENCOUNTER — APPOINTMENT (OUTPATIENT)
Dept: OBGYN | Facility: CLINIC | Age: 35
End: 2025-09-15
Payer: COMMERCIAL

## 2025-09-15 VITALS — TEMPERATURE: 97.2 F | WEIGHT: 210 LBS | HEIGHT: 60 IN | BODY MASS INDEX: 41.23 KG/M2

## 2025-09-15 LAB
APPEARANCE: CLEAR
BILIRUBIN URINE: NEGATIVE
BLOOD URINE: ABNORMAL
COLOR: YELLOW
GLUCOSE QUALITATIVE U: NEGATIVE
KETONES URINE: NEGATIVE
LEUKOCYTE ESTERASE URINE: ABNORMAL
NITRITE URINE: NEGATIVE
PH URINE: 5.5
PROTEIN URINE: 30
SPECIFIC GRAVITY URINE: >=1.03
UROBILINOGEN URINE: 0.2 (ref 0.2–?)

## 2025-09-15 PROCEDURE — 0502F SUBSEQUENT PRENATAL CARE: CPT

## 2025-09-16 ENCOUNTER — TRANSCRIPTION ENCOUNTER (OUTPATIENT)
Age: 35
End: 2025-09-16